# Patient Record
Sex: FEMALE | Race: WHITE | NOT HISPANIC OR LATINO | Employment: UNEMPLOYED | URBAN - METROPOLITAN AREA
[De-identification: names, ages, dates, MRNs, and addresses within clinical notes are randomized per-mention and may not be internally consistent; named-entity substitution may affect disease eponyms.]

---

## 2017-06-15 ENCOUNTER — OFFICE VISIT - HEALTHEAST (OUTPATIENT)
Dept: FAMILY MEDICINE | Facility: CLINIC | Age: 47
End: 2017-06-15

## 2017-06-15 DIAGNOSIS — Z63.9 FAMILY PROBLEMS: ICD-10-CM

## 2017-06-15 DIAGNOSIS — F51.04 PSYCHOPHYSIOLOGICAL INSOMNIA: ICD-10-CM

## 2017-06-15 DIAGNOSIS — F41.9 ANXIETY: ICD-10-CM

## 2017-06-15 SDOH — SOCIAL STABILITY - SOCIAL INSECURITY: PROBLEM RELATED TO PRIMARY SUPPORT GROUP, UNSPECIFIED: Z63.9

## 2017-06-15 ASSESSMENT — MIFFLIN-ST. JEOR: SCORE: 1646.68

## 2017-07-26 ENCOUNTER — RECORDS - HEALTHEAST (OUTPATIENT)
Dept: GENERAL RADIOLOGY | Age: 47
End: 2017-07-26

## 2017-07-26 ENCOUNTER — OFFICE VISIT - HEALTHEAST (OUTPATIENT)
Dept: FAMILY MEDICINE | Facility: CLINIC | Age: 47
End: 2017-07-26

## 2017-07-26 DIAGNOSIS — R82.2 BILIRUBIN IN URINE: ICD-10-CM

## 2017-07-26 DIAGNOSIS — R50.9 FEVER, UNSPECIFIED: ICD-10-CM

## 2017-07-26 DIAGNOSIS — R50.9 FEVER: ICD-10-CM

## 2017-08-14 ENCOUNTER — HOSPITAL ENCOUNTER (OUTPATIENT)
Dept: MAMMOGRAPHY | Facility: HOSPITAL | Age: 47
Discharge: HOME | End: 2017-08-14
Attending: INTERNAL MEDICINE

## 2017-08-14 ENCOUNTER — OFFICE VISIT - HEALTHEAST (OUTPATIENT)
Dept: ONCOLOGY | Facility: HOSPITAL | Age: 47
End: 2017-08-14

## 2017-08-14 DIAGNOSIS — C50.412 BREAST CANCER OF UPPER-OUTER QUADRANT OF LEFT FEMALE BREAST (H): ICD-10-CM

## 2017-09-07 ENCOUNTER — COMMUNICATION - HEALTHEAST (OUTPATIENT)
Dept: FAMILY MEDICINE | Facility: CLINIC | Age: 47
End: 2017-09-07

## 2017-09-07 ENCOUNTER — RECORDS - HEALTHEAST (OUTPATIENT)
Dept: ADMINISTRATIVE | Facility: OTHER | Age: 47
End: 2017-09-07

## 2017-09-07 ENCOUNTER — AMBULATORY - HEALTHEAST (OUTPATIENT)
Dept: FAMILY MEDICINE | Facility: CLINIC | Age: 47
End: 2017-09-07

## 2017-10-06 ENCOUNTER — COMMUNICATION - HEALTHEAST (OUTPATIENT)
Dept: FAMILY MEDICINE | Facility: CLINIC | Age: 47
End: 2017-10-06

## 2017-10-12 ENCOUNTER — AMBULATORY - HEALTHEAST (OUTPATIENT)
Dept: FAMILY MEDICINE | Facility: CLINIC | Age: 47
End: 2017-10-12

## 2017-10-12 ENCOUNTER — RECORDS - HEALTHEAST (OUTPATIENT)
Dept: ADMINISTRATIVE | Facility: OTHER | Age: 47
End: 2017-10-12

## 2017-12-27 ENCOUNTER — HOSPITAL ENCOUNTER (OUTPATIENT)
Dept: MAMMOGRAPHY | Facility: HOSPITAL | Age: 47
Discharge: HOME | End: 2017-12-27
Attending: INTERNAL MEDICINE

## 2017-12-27 DIAGNOSIS — Z12.31 VISIT FOR SCREENING MAMMOGRAM: ICD-10-CM

## 2018-01-09 ENCOUNTER — OFFICE VISIT - HEALTHEAST (OUTPATIENT)
Dept: FAMILY MEDICINE | Facility: CLINIC | Age: 48
End: 2018-01-09

## 2018-01-09 DIAGNOSIS — R51.9 HEADACHE: ICD-10-CM

## 2018-01-09 DIAGNOSIS — L98.9 SKIN LESION OF RIGHT LOWER EXTREMITY: ICD-10-CM

## 2018-01-09 DIAGNOSIS — M99.01 SOMATIC DYSFUNCTION OF CERVICAL REGION: ICD-10-CM

## 2018-01-09 DIAGNOSIS — M99.02 SOMATIC DYSFUNCTION OF THORACIC REGION: ICD-10-CM

## 2018-01-09 ASSESSMENT — MIFFLIN-ST. JEOR: SCORE: 1660.29

## 2018-03-29 ENCOUNTER — COMMUNICATION - HEALTHEAST (OUTPATIENT)
Dept: FAMILY MEDICINE | Facility: CLINIC | Age: 48
End: 2018-03-29

## 2018-03-29 ENCOUNTER — COMMUNICATION - HEALTHEAST (OUTPATIENT)
Dept: SCHEDULING | Facility: CLINIC | Age: 48
End: 2018-03-29

## 2018-03-29 DIAGNOSIS — J45.909 ASTHMA: ICD-10-CM

## 2018-07-17 ENCOUNTER — AMBULATORY - HEALTHEAST (OUTPATIENT)
Dept: FAMILY MEDICINE | Facility: CLINIC | Age: 48
End: 2018-07-17

## 2018-07-17 DIAGNOSIS — J45.909 REACTIVE AIRWAY DISEASE: ICD-10-CM

## 2018-07-17 DIAGNOSIS — L91.8 SKIN TAGS, MULTIPLE ACQUIRED: ICD-10-CM

## 2018-08-14 ENCOUNTER — AMBULATORY - HEALTHEAST (OUTPATIENT)
Dept: LAB | Facility: CLINIC | Age: 48
End: 2018-08-14

## 2018-08-14 DIAGNOSIS — C50.412 BREAST CANCER OF UPPER-OUTER QUADRANT OF LEFT FEMALE BREAST (H): ICD-10-CM

## 2018-08-14 LAB
ALBUMIN SERPL-MCNC: 3.8 G/DL (ref 3.5–5)
ALP SERPL-CCNC: 111 U/L (ref 45–120)
ALT SERPL W P-5'-P-CCNC: 16 U/L (ref 0–45)
ANION GAP SERPL CALCULATED.3IONS-SCNC: 14 MMOL/L (ref 5–18)
AST SERPL W P-5'-P-CCNC: 18 U/L (ref 0–40)
BASOPHILS # BLD AUTO: 0.1 THOU/UL (ref 0–0.2)
BASOPHILS NFR BLD AUTO: 1 % (ref 0–2)
BILIRUB SERPL-MCNC: 0.4 MG/DL (ref 0–1)
BUN SERPL-MCNC: 11 MG/DL (ref 8–22)
CALCIUM SERPL-MCNC: 9.8 MG/DL (ref 8.5–10.5)
CHLORIDE BLD-SCNC: 106 MMOL/L (ref 98–107)
CO2 SERPL-SCNC: 23 MMOL/L (ref 22–31)
CREAT SERPL-MCNC: 0.8 MG/DL (ref 0.6–1.1)
EOSINOPHIL # BLD AUTO: 0.3 THOU/UL (ref 0–0.4)
EOSINOPHIL NFR BLD AUTO: 3 % (ref 0–6)
ERYTHROCYTE [DISTWIDTH] IN BLOOD BY AUTOMATED COUNT: 13.9 % (ref 11–14.5)
GFR SERPL CREATININE-BSD FRML MDRD: >60 ML/MIN/1.73M2
GLUCOSE BLD-MCNC: 104 MG/DL (ref 70–125)
HCT VFR BLD AUTO: 40.1 % (ref 35–47)
HGB BLD-MCNC: 13.1 G/DL (ref 12–16)
LYMPHOCYTES # BLD AUTO: 2.6 THOU/UL (ref 0.8–4.4)
LYMPHOCYTES NFR BLD AUTO: 27 % (ref 20–40)
MCH RBC QN AUTO: 25.5 PG (ref 27–34)
MCHC RBC AUTO-ENTMCNC: 32.7 G/DL (ref 32–36)
MCV RBC AUTO: 78 FL (ref 80–100)
MONOCYTES # BLD AUTO: 0.6 THOU/UL (ref 0–0.9)
MONOCYTES NFR BLD AUTO: 6 % (ref 2–10)
NEUTROPHILS # BLD AUTO: 6.1 THOU/UL (ref 2–7.7)
NEUTROPHILS NFR BLD AUTO: 63 % (ref 50–70)
PLATELET # BLD AUTO: 324 THOU/UL (ref 140–440)
PMV BLD AUTO: 7.4 FL (ref 7–10)
POTASSIUM BLD-SCNC: 4.4 MMOL/L (ref 3.5–5)
PROT SERPL-MCNC: 7.1 G/DL (ref 6–8)
RBC # BLD AUTO: 5.13 MILL/UL (ref 3.8–5.4)
SODIUM SERPL-SCNC: 143 MMOL/L (ref 136–145)
WBC: 9.7 THOU/UL (ref 4–11)

## 2018-08-16 ENCOUNTER — OFFICE VISIT - HEALTHEAST (OUTPATIENT)
Dept: ONCOLOGY | Facility: HOSPITAL | Age: 48
End: 2018-08-16

## 2018-08-16 DIAGNOSIS — Z17.0 MALIGNANT NEOPLASM OF UPPER-OUTER QUADRANT OF LEFT BREAST IN FEMALE, ESTROGEN RECEPTOR POSITIVE (H): ICD-10-CM

## 2018-08-16 DIAGNOSIS — C50.412 MALIGNANT NEOPLASM OF UPPER-OUTER QUADRANT OF LEFT BREAST IN FEMALE, ESTROGEN RECEPTOR POSITIVE (H): ICD-10-CM

## 2018-10-29 ENCOUNTER — AMBULATORY - HEALTHEAST (OUTPATIENT)
Dept: FAMILY MEDICINE | Facility: CLINIC | Age: 48
End: 2018-10-29

## 2018-10-29 DIAGNOSIS — J20.9 ACUTE BRONCHITIS: ICD-10-CM

## 2019-02-27 ENCOUNTER — HOSPITAL ENCOUNTER (OUTPATIENT)
Dept: MAMMOGRAPHY | Facility: CLINIC | Age: 49
Discharge: HOME | End: 2019-02-27
Attending: INTERNAL MEDICINE

## 2019-02-27 DIAGNOSIS — Z12.31 VISIT FOR SCREENING MAMMOGRAM: ICD-10-CM

## 2019-03-05 ENCOUNTER — OFFICE VISIT - HEALTHEAST (OUTPATIENT)
Dept: FAMILY MEDICINE | Facility: CLINIC | Age: 49
End: 2019-03-05

## 2019-03-05 DIAGNOSIS — J06.9 VIRAL URI WITH COUGH: ICD-10-CM

## 2019-03-05 DIAGNOSIS — R07.0 THROAT PAIN: ICD-10-CM

## 2019-03-05 DIAGNOSIS — J45.21 EXACERBATION OF INTERMITTENT ASTHMA, UNSPECIFIED ASTHMA SEVERITY: ICD-10-CM

## 2019-03-05 LAB — DEPRECATED S PYO AG THROAT QL EIA: NORMAL

## 2019-03-06 LAB — GROUP A STREP BY PCR: NORMAL

## 2019-05-20 ENCOUNTER — COMMUNICATION - HEALTHEAST (OUTPATIENT)
Dept: ADMINISTRATIVE | Facility: HOSPITAL | Age: 49
End: 2019-05-20

## 2019-06-12 ENCOUNTER — OFFICE VISIT - HEALTHEAST (OUTPATIENT)
Dept: FAMILY MEDICINE | Facility: CLINIC | Age: 49
End: 2019-06-12

## 2019-06-12 DIAGNOSIS — R10.13 ABDOMINAL PAIN, EPIGASTRIC: ICD-10-CM

## 2019-06-12 DIAGNOSIS — Z90.12 H/O MASTECTOMY, LEFT: ICD-10-CM

## 2019-06-12 ASSESSMENT — MIFFLIN-ST. JEOR: SCORE: 1686.14

## 2019-08-07 ENCOUNTER — AMBULATORY - HEALTHEAST (OUTPATIENT)
Dept: LAB | Facility: CLINIC | Age: 49
End: 2019-08-07

## 2019-08-07 DIAGNOSIS — Z17.0 MALIGNANT NEOPLASM OF UPPER-OUTER QUADRANT OF LEFT BREAST IN FEMALE, ESTROGEN RECEPTOR POSITIVE (H): ICD-10-CM

## 2019-08-07 DIAGNOSIS — C50.412 MALIGNANT NEOPLASM OF UPPER-OUTER QUADRANT OF LEFT BREAST IN FEMALE, ESTROGEN RECEPTOR POSITIVE (H): ICD-10-CM

## 2019-08-07 LAB
ALBUMIN SERPL-MCNC: 4.1 G/DL (ref 3.5–5)
ALP SERPL-CCNC: 118 U/L (ref 45–120)
ALT SERPL W P-5'-P-CCNC: 14 U/L (ref 0–45)
ANION GAP SERPL CALCULATED.3IONS-SCNC: 10 MMOL/L (ref 5–18)
AST SERPL W P-5'-P-CCNC: 17 U/L (ref 0–40)
BASOPHILS # BLD AUTO: 0.2 THOU/UL (ref 0–0.2)
BASOPHILS NFR BLD AUTO: 1 % (ref 0–2)
BILIRUB SERPL-MCNC: 0.4 MG/DL (ref 0–1)
BUN SERPL-MCNC: 13 MG/DL (ref 8–22)
CALCIUM SERPL-MCNC: 10.3 MG/DL (ref 8.5–10.5)
CHLORIDE BLD-SCNC: 102 MMOL/L (ref 98–107)
CO2 SERPL-SCNC: 26 MMOL/L (ref 22–31)
CREAT SERPL-MCNC: 0.92 MG/DL (ref 0.6–1.1)
EOSINOPHIL # BLD AUTO: 0.3 THOU/UL (ref 0–0.4)
EOSINOPHIL NFR BLD AUTO: 2 % (ref 0–6)
ERYTHROCYTE [DISTWIDTH] IN BLOOD BY AUTOMATED COUNT: 13.9 % (ref 11–14.5)
GFR SERPL CREATININE-BSD FRML MDRD: >60 ML/MIN/1.73M2
GLUCOSE BLD-MCNC: 117 MG/DL (ref 70–125)
HCT VFR BLD AUTO: 39.2 % (ref 35–47)
HGB BLD-MCNC: 13.2 G/DL (ref 12–16)
LYMPHOCYTES # BLD AUTO: 2.9 THOU/UL (ref 0.8–4.4)
LYMPHOCYTES NFR BLD AUTO: 21 % (ref 20–40)
MCH RBC QN AUTO: 26.1 PG (ref 27–34)
MCHC RBC AUTO-ENTMCNC: 33.7 G/DL (ref 32–36)
MCV RBC AUTO: 78 FL (ref 80–100)
MONOCYTES # BLD AUTO: 0.6 THOU/UL (ref 0–0.9)
MONOCYTES NFR BLD AUTO: 4 % (ref 2–10)
NEUTROPHILS # BLD AUTO: 10.3 THOU/UL (ref 2–7.7)
NEUTROPHILS NFR BLD AUTO: 72 % (ref 50–70)
PLATELET # BLD AUTO: 389 THOU/UL (ref 140–440)
PMV BLD AUTO: 7.4 FL (ref 7–10)
POTASSIUM BLD-SCNC: 4.2 MMOL/L (ref 3.5–5)
PROT SERPL-MCNC: 7.9 G/DL (ref 6–8)
RBC # BLD AUTO: 5.05 MILL/UL (ref 3.8–5.4)
SODIUM SERPL-SCNC: 138 MMOL/L (ref 136–145)
WBC: 14.3 THOU/UL (ref 4–11)

## 2019-08-21 ENCOUNTER — OFFICE VISIT - HEALTHEAST (OUTPATIENT)
Dept: ONCOLOGY | Facility: HOSPITAL | Age: 49
End: 2019-08-21

## 2019-08-21 DIAGNOSIS — Z17.0 MALIGNANT NEOPLASM OF UPPER-OUTER QUADRANT OF LEFT BREAST IN FEMALE, ESTROGEN RECEPTOR POSITIVE (H): ICD-10-CM

## 2019-08-21 DIAGNOSIS — C50.412 MALIGNANT NEOPLASM OF UPPER-OUTER QUADRANT OF LEFT BREAST IN FEMALE, ESTROGEN RECEPTOR POSITIVE (H): ICD-10-CM

## 2019-10-21 ENCOUNTER — COMMUNICATION - HEALTHEAST (OUTPATIENT)
Dept: OTHER | Facility: CLINIC | Age: 49
End: 2019-10-21

## 2019-10-28 ENCOUNTER — AMBULATORY - HEALTHEAST (OUTPATIENT)
Dept: OTHER | Facility: CLINIC | Age: 49
End: 2019-10-28

## 2019-11-11 ENCOUNTER — AMBULATORY - HEALTHEAST (OUTPATIENT)
Dept: OTHER | Facility: CLINIC | Age: 49
End: 2019-11-11

## 2019-11-13 ENCOUNTER — OFFICE VISIT - HEALTHEAST (OUTPATIENT)
Dept: FAMILY MEDICINE | Facility: CLINIC | Age: 49
End: 2019-11-13

## 2019-11-13 DIAGNOSIS — B35.3 TINEA PEDIS OF RIGHT FOOT: ICD-10-CM

## 2019-11-13 DIAGNOSIS — R07.0 THROAT PAIN: ICD-10-CM

## 2019-11-13 LAB — DEPRECATED S PYO AG THROAT QL EIA: NORMAL

## 2019-11-14 LAB — GROUP A STREP BY PCR: NORMAL

## 2019-11-24 ENCOUNTER — OFFICE VISIT - HEALTHEAST (OUTPATIENT)
Dept: FAMILY MEDICINE | Facility: CLINIC | Age: 49
End: 2019-11-24

## 2019-11-24 DIAGNOSIS — R05.9 COUGH: ICD-10-CM

## 2019-11-24 DIAGNOSIS — J20.9 ACUTE BRONCHITIS WITH SYMPTOMS GREATER THAN 10 DAYS: ICD-10-CM

## 2019-12-13 ENCOUNTER — AMBULATORY - HEALTHEAST (OUTPATIENT)
Dept: OTHER | Facility: CLINIC | Age: 49
End: 2019-12-13

## 2020-02-10 ENCOUNTER — COMMUNICATION - HEALTHEAST (OUTPATIENT)
Dept: SCHEDULING | Facility: CLINIC | Age: 50
End: 2020-02-10

## 2020-05-11 ENCOUNTER — TRANSFERRED RECORDS (OUTPATIENT)
Dept: HEALTH INFORMATION MANAGEMENT | Facility: CLINIC | Age: 50
End: 2020-05-11

## 2020-05-11 ENCOUNTER — HOSPITAL ENCOUNTER (OUTPATIENT)
Dept: MAMMOGRAPHY | Facility: CLINIC | Age: 50
Discharge: HOME | End: 2020-05-11
Attending: INTERNAL MEDICINE

## 2020-05-11 DIAGNOSIS — Z12.31 VISIT FOR SCREENING MAMMOGRAM: ICD-10-CM

## 2020-05-13 ENCOUNTER — HOSPITAL ENCOUNTER (OUTPATIENT)
Dept: MAMMOGRAPHY | Facility: CLINIC | Age: 50
Discharge: HOME | End: 2020-05-13
Attending: INTERNAL MEDICINE

## 2020-05-13 DIAGNOSIS — N64.89 BREAST ASYMMETRY: ICD-10-CM

## 2020-05-29 ENCOUNTER — OFFICE VISIT - HEALTHEAST (OUTPATIENT)
Dept: FAMILY MEDICINE | Facility: CLINIC | Age: 50
End: 2020-05-29

## 2020-05-29 DIAGNOSIS — S91.312A FOOT LACERATION, LEFT, INITIAL ENCOUNTER: ICD-10-CM

## 2020-08-13 ENCOUNTER — OFFICE VISIT - HEALTHEAST (OUTPATIENT)
Dept: ONCOLOGY | Facility: HOSPITAL | Age: 50
End: 2020-08-13

## 2020-08-13 DIAGNOSIS — C50.412 MALIGNANT NEOPLASM OF UPPER-OUTER QUADRANT OF LEFT BREAST IN FEMALE, ESTROGEN RECEPTOR POSITIVE (H): ICD-10-CM

## 2020-08-13 DIAGNOSIS — Z17.0 MALIGNANT NEOPLASM OF UPPER-OUTER QUADRANT OF LEFT BREAST IN FEMALE, ESTROGEN RECEPTOR POSITIVE (H): ICD-10-CM

## 2020-08-13 ASSESSMENT — MIFFLIN-ST. JEOR: SCORE: 1705.64

## 2020-12-14 ENCOUNTER — OFFICE VISIT (OUTPATIENT)
Dept: FAMILY MEDICINE | Facility: CLINIC | Age: 50
End: 2020-12-14
Payer: COMMERCIAL

## 2020-12-14 VITALS
RESPIRATION RATE: 12 BRPM | SYSTOLIC BLOOD PRESSURE: 136 MMHG | HEART RATE: 64 BPM | BODY MASS INDEX: 39.99 KG/M2 | HEIGHT: 65 IN | TEMPERATURE: 97.8 F | WEIGHT: 240 LBS | DIASTOLIC BLOOD PRESSURE: 84 MMHG

## 2020-12-14 DIAGNOSIS — Z87.898 HX OF FEVER: ICD-10-CM

## 2020-12-14 DIAGNOSIS — R42 VERTIGO: Primary | ICD-10-CM

## 2020-12-14 DIAGNOSIS — E66.01 MORBID OBESITY (H): ICD-10-CM

## 2020-12-14 DIAGNOSIS — Z00.00 HEALTHCARE MAINTENANCE: ICD-10-CM

## 2020-12-14 PROBLEM — L71.9 ROSACEA: Status: ACTIVE | Noted: 2020-12-14

## 2020-12-14 PROBLEM — M94.0 TIETZE'S DISEASE: Status: ACTIVE | Noted: 2020-12-14

## 2020-12-14 PROBLEM — R06.2 WHEEZING: Status: ACTIVE | Noted: 2020-12-14

## 2020-12-14 PROBLEM — I83.90 ASYMPTOMATIC VARICOSE VEINS: Status: ACTIVE | Noted: 2020-12-14

## 2020-12-14 PROBLEM — R10.9 ABDOMINAL PAIN, OTHER SPECIFIED SITE: Status: ACTIVE | Noted: 2020-12-14

## 2020-12-14 PROBLEM — E78.5 HYPERLIPIDEMIA: Status: ACTIVE | Noted: 2020-12-14

## 2020-12-14 PROBLEM — T50.901A POISONING BY DRUG: Status: ACTIVE | Noted: 2020-12-14

## 2020-12-14 LAB
ALBUMIN SERPL-MCNC: 3.9 G/DL (ref 3.4–5)
ALP SERPL-CCNC: 131 U/L (ref 40–150)
ALT SERPL W P-5'-P-CCNC: 21 U/L (ref 0–50)
ANION GAP SERPL CALCULATED.3IONS-SCNC: 3 MMOL/L (ref 3–14)
AST SERPL W P-5'-P-CCNC: 18 U/L (ref 0–45)
BILIRUB SERPL-MCNC: 0.5 MG/DL (ref 0.2–1.3)
BUN SERPL-MCNC: 10 MG/DL (ref 7–30)
CALCIUM SERPL-MCNC: 9.5 MG/DL (ref 8.5–10.1)
CHLORIDE SERPL-SCNC: 105 MMOL/L (ref 94–109)
CHOLEST SERPL-MCNC: 211 MG/DL
CO2 SERPL-SCNC: 29 MMOL/L (ref 20–32)
CREAT SERPL-MCNC: 0.79 MG/DL (ref 0.52–1.04)
ERYTHROCYTE [DISTWIDTH] IN BLOOD BY AUTOMATED COUNT: 15.4 % (ref 10–15)
GFR SERPL CREATININE-BSD FRML MDRD: 87 ML/MIN/{1.73_M2}
GLUCOSE SERPL-MCNC: 113 MG/DL (ref 70–99)
HBA1C MFR BLD: 6.1 % (ref 0–5.6)
HCT VFR BLD AUTO: 41.9 % (ref 35–47)
HDLC SERPL-MCNC: 34 MG/DL
HGB BLD-MCNC: 13.5 G/DL (ref 11.7–15.7)
IRON SATN MFR SERPL: 14 % (ref 15–46)
IRON SERPL-MCNC: 47 UG/DL (ref 35–180)
LDLC SERPL CALC-MCNC: 131 MG/DL
MCH RBC QN AUTO: 25.7 PG (ref 26.5–33)
MCHC RBC AUTO-ENTMCNC: 32.2 G/DL (ref 31.5–36.5)
MCV RBC AUTO: 80 FL (ref 78–100)
NONHDLC SERPL-MCNC: 177 MG/DL
PLATELET # BLD AUTO: 332 10E9/L (ref 150–450)
POTASSIUM SERPL-SCNC: 4.6 MMOL/L (ref 3.4–5.3)
PROT SERPL-MCNC: 8.2 G/DL (ref 6.8–8.8)
RBC # BLD AUTO: 5.26 10E12/L (ref 3.8–5.2)
SODIUM SERPL-SCNC: 137 MMOL/L (ref 133–144)
TIBC SERPL-MCNC: 326 UG/DL (ref 240–430)
TRIGL SERPL-MCNC: 232 MG/DL
WBC # BLD AUTO: 10.8 10E9/L (ref 4–11)

## 2020-12-14 PROCEDURE — 85027 COMPLETE CBC AUTOMATED: CPT | Performed by: FAMILY MEDICINE

## 2020-12-14 PROCEDURE — 80061 LIPID PANEL: CPT | Performed by: FAMILY MEDICINE

## 2020-12-14 PROCEDURE — 36415 COLL VENOUS BLD VENIPUNCTURE: CPT | Performed by: FAMILY MEDICINE

## 2020-12-14 PROCEDURE — 82306 VITAMIN D 25 HYDROXY: CPT | Performed by: FAMILY MEDICINE

## 2020-12-14 PROCEDURE — 80053 COMPREHEN METABOLIC PANEL: CPT | Performed by: FAMILY MEDICINE

## 2020-12-14 PROCEDURE — 99203 OFFICE O/P NEW LOW 30 MIN: CPT | Performed by: FAMILY MEDICINE

## 2020-12-14 PROCEDURE — 83540 ASSAY OF IRON: CPT | Performed by: FAMILY MEDICINE

## 2020-12-14 PROCEDURE — 83036 HEMOGLOBIN GLYCOSYLATED A1C: CPT | Performed by: FAMILY MEDICINE

## 2020-12-14 PROCEDURE — 83550 IRON BINDING TEST: CPT | Performed by: FAMILY MEDICINE

## 2020-12-14 RX ORDER — ALBUTEROL SULFATE 90 UG/1
2 AEROSOL, METERED RESPIRATORY (INHALATION)
COMMUNITY
Start: 2019-03-05 | End: 2022-02-25

## 2020-12-14 ASSESSMENT — MIFFLIN-ST. JEOR: SCORE: 1696.57

## 2020-12-15 NOTE — PROGRESS NOTES
"Assessment/Plan:    Nichol Gee is a 50 year old female presenting for:    1. Vertigo  Labs below will be drawn.  I suspect this is most likely due to benign paroxysmal positional vertigo.  She will contact me if this continues to worsen at which point we should consider formal physical therapy.    - Vitamin D Deficiency  - CBC with platelets  - Iron and iron binding capacity  - Thyroid Cascade Profile ([facility])  - Comprehensive metabolic panel (BMP + Alb, Alk Phos, ALT, AST, Total. Bili, TP)  - Hemoglobin A1c    2. Healthcare maintenance  - Lipid panel reflex to direct LDL Fasting    3. Hx of fever  Due to history of fever will check Covid antibody.  Discussed that because her fever was in January likely is will be negative even if she had coronavirus.  - COVID-19 Virus (Coronavirus) Antibody & Titer Reflex; Future    There are no discontinued medications.        Chief Complaint:  Dizziness and Recheck Medication        Subjective:   Nichol Gee is a very pleasant 50-year-old female presenting to the clinic today for several concerns:    1.  Vertigo: Patient states that occasionally over the last 2 weeks she feels a bit \"woozy.\"  She does not feel specifically vertiginous but slightly off balance.  She notes this when she turns her head or makes any movements.  She has not had this in the past.  She is very concerned that this could potentially be indicative of diabetes mellitus or a different deficiency.    2.  Healthcare maintenance: Patient is hoping to get a cholesterol level drawn today.  She is fasting.    3.  History of fever: She states that she was in January.  She is wondering about Covid antibody testing.    12 point review of systems completed and negative except for what has been described above    History   Smoking Status     Never Smoker   Smokeless Tobacco     Never Used         Current Outpatient Medications:      albuterol (PROAIR HFA/PROVENTIL HFA/VENTOLIN HFA) 108 (90 Base) MCG/ACT " "inhaler, Inhale 2 puffs into the lungs, Disp: , Rfl:      ranitidine (ZANTAC) 150 MG tablet, Take 150 mg by mouth, Disp: , Rfl:       Objective:  Vitals:    12/14/20 0855 12/14/20 0919   BP: (!) 142/80 136/84   Pulse: 64    Resp: 12    Temp: 97.8  F (36.6  C)    TempSrc: Tympanic    Weight: 108.9 kg (240 lb)    Height: 1.638 m (5' 4.5\")        Body mass index is 40.56 kg/m .    Vital signs reviewed and stable  General: No acute distress  Psych: Appropriate affect  HEENT: moist mucous membranes, pupils equal, round, reactive to light and accomodation, tympanic membranes are pearly grey bilaterally  Lymph: no cervical or supraclavicular lymphadenopathy  Cardiovascular: regular rate and rhythm with no murmur  Pulmonary: clear to auscultation bilaterally with no wheeze  Abdomen: soft, non tender, non distended with normo-active bowel sounds  Extremities: warm and well perfused with no edema  Skin: warm and dry with no rash  Neurologic: Cranial nerve II through XII are intact, 5 out of 5 strength in upper and lower extremities, unable to reproduce lightheadedness/dizziness     This note has been dictated and transcribed using voice recognition software.   Any errors in transcription are unintentional and inherent to the software.  "

## 2020-12-16 LAB — DEPRECATED CALCIDIOL+CALCIFEROL SERPL-MC: 9 UG/L (ref 20–75)

## 2020-12-17 DIAGNOSIS — E55.9 HYPOVITAMINOSIS D: Primary | ICD-10-CM

## 2020-12-17 RX ORDER — ERGOCALCIFEROL 1.25 MG/1
50000 CAPSULE, LIQUID FILLED ORAL WEEKLY
Qty: 12 CAPSULE | Refills: 0 | Status: SHIPPED | OUTPATIENT
Start: 2020-12-17 | End: 2021-01-21

## 2021-01-09 ENCOUNTER — HEALTH MAINTENANCE LETTER (OUTPATIENT)
Age: 51
End: 2021-01-09

## 2021-01-21 ENCOUNTER — MYC MEDICAL ADVICE (OUTPATIENT)
Dept: FAMILY MEDICINE | Facility: CLINIC | Age: 51
End: 2021-01-21

## 2021-01-21 DIAGNOSIS — E55.9 HYPOVITAMINOSIS D: ICD-10-CM

## 2021-01-21 RX ORDER — ERGOCALCIFEROL 1.25 MG/1
50000 CAPSULE, LIQUID FILLED ORAL WEEKLY
Qty: 12 CAPSULE | Refills: 0 | Status: CANCELLED | OUTPATIENT
Start: 2021-01-21

## 2021-01-21 RX ORDER — ERGOCALCIFEROL 1.25 MG/1
50000 CAPSULE, LIQUID FILLED ORAL WEEKLY
Qty: 4 CAPSULE | Refills: 0 | Status: SHIPPED | OUTPATIENT
Start: 2021-01-21 | End: 2021-01-21

## 2021-01-21 NOTE — TELEPHONE ENCOUNTER
See my chart, patient says that only 8 pills called in for the Vit D, the remainder 4 sent today, then this nurse again discontinued due to looked like there was 12 sent on 12-, will again remind patient needs labs at 12 week hortencia.    KIMBERLY Ronquillo

## 2021-02-11 ENCOUNTER — TELEPHONE (OUTPATIENT)
Dept: FAMILY MEDICINE | Facility: CLINIC | Age: 51
End: 2021-02-11

## 2021-02-11 DIAGNOSIS — E55.9 HYPOVITAMINOSIS D: Primary | ICD-10-CM

## 2021-02-11 NOTE — TELEPHONE ENCOUNTER
"Patient calls and is trying to schedule a vitamin D recheck. No labs in place. Per dictation from 12/17/20:  Viewed by Nichol Gee on 1/21/2021 12:15 PM  Written by Mitzi Jane MD on 12/17/2020  8:38 AM  Vitamin D is very low - I would recommend a prescription of 50,000 IU of vitamin D once weekly for 12 weeks - I will send this to your pharmacy now.  We can recheck your blood levels at a \"lab only\" appointment in about 3 months.  Please contact the clinic to schedule that appointment after you take your last of the 12 tablets.    Placed Vitamin D lab per written order above. Patient will schedule.   "

## 2021-03-08 DIAGNOSIS — E55.9 HYPOVITAMINOSIS D: ICD-10-CM

## 2021-03-08 LAB — DEPRECATED CALCIDIOL+CALCIFEROL SERPL-MC: 21 UG/L (ref 20–75)

## 2021-03-08 PROCEDURE — 82306 VITAMIN D 25 HYDROXY: CPT | Performed by: FAMILY MEDICINE

## 2021-03-08 PROCEDURE — 36415 COLL VENOUS BLD VENIPUNCTURE: CPT | Performed by: FAMILY MEDICINE

## 2021-05-25 ENCOUNTER — HOSPITAL ENCOUNTER (OUTPATIENT)
Dept: MAMMOGRAPHY | Facility: CLINIC | Age: 51
Discharge: HOME | End: 2021-05-25
Attending: INTERNAL MEDICINE
Payer: COMMERCIAL

## 2021-05-25 ENCOUNTER — RECORDS - HEALTHEAST (OUTPATIENT)
Dept: ADMINISTRATIVE | Facility: CLINIC | Age: 51
End: 2021-05-25

## 2021-05-25 DIAGNOSIS — Z12.31 VISIT FOR SCREENING MAMMOGRAM: ICD-10-CM

## 2021-05-26 ENCOUNTER — RECORDS - HEALTHEAST (OUTPATIENT)
Dept: ADMINISTRATIVE | Facility: CLINIC | Age: 51
End: 2021-05-26

## 2021-05-27 ENCOUNTER — RECORDS - HEALTHEAST (OUTPATIENT)
Dept: ADMINISTRATIVE | Facility: CLINIC | Age: 51
End: 2021-05-27

## 2021-05-29 NOTE — PROGRESS NOTES
Assessment/Plan:    Nichol Gee is a 48 y.o. female presenting for:    1. Abdominal pain, epigastric  Given the nature of the patient's pain as well as her benign physical examination today as well as the temporal relation to food this seems most likely to be gastritis.  I think this is much less likely a heart condition or her gallbladder although these were considered today.    Zantac sent to the pharmacy.  She will take this twice daily for the next 2 weeks.  Carafate suspension sent as well and she will use this prior to eating if insurance covers it.    She will contact me if there are further concerns of her pain is worsening.  Certainly we could try Prilosec at some point if needed as well but I suspect that over the next few days things will improve.  Discussed a bland diet.    We did discuss signs and symptoms that would necessitate further follow-up.  - ranitidine (ZANTAC) 150 MG tablet; Take 1 tablet (150 mg total) by mouth 2 (two) times a day.  Dispense: 60 tablet; Refill: 1  - sucralfate (CARAFATE) 100 mg/mL suspension; Take 10 mL (1 g total) by mouth 4 (four) times a day.  Dispense: 420 mL; Refill: 1    2. H/O mastectomy, left  Prescription for mastectomy bras given today.  If she is 12 years in remission.  She follows with oncology yearly.        Medications Discontinued During This Encounter   Medication Reason     cholecalciferol, vitamin D3, (VITAMIN D3) 5,000 unit Tab      diazePAM (VALIUM) 5 MG tablet Therapy completed     propranolol (INDERAL) 20 MG tablet Therapy completed           Chief Complaint:  Chief Complaint   Patient presents with     Abdominal Pain     Pt complains of abdominal pain located in the center of abdomen above her belly button since Tuesday morning       Subjective:   Nichol Gee is a pleasant 48-year-old female presenting to the clinic today with concerns over abdominal pain.  Patient states that she had some very spicy dip 3 days ago.  When she awoke the day after  "eating that she had epigastric abdominal pain.  When she ate something this became much worse.  She states that she drank a soda very quickly and burped a few times which seemed to help however the pain persisted.  Is slowly improved but then she had a bagel which irritated things again.  She states that again it improved but had some soup last night which made the pain worse once again.  If she has then not eaten anything since then.  She had some water this morning which she tolerated well.  She does not have any pain currently.    She describes the pain as centrally located.  She does not have any associated shortness of breath.  She notes that it is not more on the right side or the left.  She does not have a history of any abdominal surgeries other than a hysterectomy.    12 point review of systems completed and negative except for what has been described above    Social History     Tobacco Use   Smoking Status Never Smoker   Smokeless Tobacco Never Used       Current Outpatient Medications   Medication Sig     albuterol (PROAIR HFA;PROVENTIL HFA;VENTOLIN HFA) 90 mcg/actuation inhaler Inhale 2 puffs every 6 (six) hours as needed for wheezing.     fluticasone (FLOVENT HFA) 110 mcg/actuation inhaler Inhale 1 puff 2 (two) times a day.     ibuprofen (ADVIL,MOTRIN) 200 MG tablet Take 200 mg by mouth every 6 (six) hours as needed for pain. Pt states she uses PRN     triamcinolone (KENALOG) 0.1 % cream Apply to affected area twice daily as needed.     albuterol (PROVENTIL) 2.5 mg /3 mL (0.083 %) nebulizer solution Take 3 mL (2.5 mg total) by nebulization every 4 (four) hours as needed for wheezing.     ranitidine (ZANTAC) 150 MG tablet Take 1 tablet (150 mg total) by mouth 2 (two) times a day.     sucralfate (CARAFATE) 100 mg/mL suspension Take 10 mL (1 g total) by mouth 4 (four) times a day.         Objective:  Vitals:    06/12/19 1029   BP: 108/76   Pulse: 76   Weight: (!) 237 lb 11.2 oz (107.8 kg)   Height: 5' 4.5\" " (1.638 m)       Body mass index is 40.17 kg/m .    Vital signs reviewed and stable  General: No acute distress  Psych: Appropriate affect  HEENT: moist mucous membranes, pupils equal, round, reactive to light and accomodation, posterior oropharynx clear of erythema or exudate, tympanic membranes are pearly grey bilaterally  Lymph: no cervical or supraclavicular lymphadenopathy  Cardiovascular: regular rate and rhythm with no murmur  Pulmonary: clear to auscultation bilaterally with no wheeze  Abdomen: soft, non tender, non distended with normo-active bowel sounds  Extremities: warm and well perfused with no edema  Skin: warm and dry with no rash         This note has been dictated and transcribed using voice recognition software.   Any errors in transcription are unintentional and inherent to the software.

## 2021-05-30 ENCOUNTER — RECORDS - HEALTHEAST (OUTPATIENT)
Dept: ADMINISTRATIVE | Facility: CLINIC | Age: 51
End: 2021-05-30

## 2021-05-31 ENCOUNTER — RECORDS - HEALTHEAST (OUTPATIENT)
Dept: ADMINISTRATIVE | Facility: CLINIC | Age: 51
End: 2021-05-31

## 2021-05-31 VITALS — HEIGHT: 65 IN | BODY MASS INDEX: 38.65 KG/M2 | WEIGHT: 232 LBS

## 2021-05-31 VITALS — HEIGHT: 65 IN | WEIGHT: 227.25 LBS | BODY MASS INDEX: 37.86 KG/M2

## 2021-05-31 VITALS — WEIGHT: 225.9 LBS | BODY MASS INDEX: 37.59 KG/M2

## 2021-05-31 VITALS — WEIGHT: 231 LBS | BODY MASS INDEX: 38.44 KG/M2

## 2021-05-31 NOTE — PROGRESS NOTES
[facility] Progress Note    Patient: Nichol Gee  MRN: [id number]  Date of Service: 8/21/2019        Reason for visit      1. Malignant neoplasm of upper-outer quadrant of left breast in female, estrogen receptor positive (H)        Assessment     1.         A 48 y.o.  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   Subsequent to that, she has been on aromatase inhibitor therapy from 02/2008 to July 2015.  Currently on observation.  2.         Poor CYP2D6 metabolism.  3.         Improved bone density.  4.         Some postmenopausal symptoms.      Plan     1.         RTC a year for check up.  She will have yearly mammogram.  2.         Good diet and exercise .  Talked about interventions to reduce weight if possible.  3.         Followup with me in a years' time .  4.         Mammogram on the right side in November.  5.         Advised to followup with me sooner if there is any other problem.      Clinical stage      Infiltrating Lobular Carcinoma Of The Breast Mixed With Other Types Of Carcinoma    Primary site: Breast (Left)    Clinical free text: ER+,NV+,[id number] positive by FISH    Clinical: Stage IIIA (T3, N1, cM0) signed by Jersey Ramos MD on 7/13/2015 11:05 AM    Pathologic: Stage IIIA (T3, N1a, cM0) signed by Jersey Ramos MD on 7/13/2015 11:05 AM    Summary: Stage IIIA (T3, N1a, cM0)      History     Nichol Gee is a very pleasant 48 y.o. 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  Subsequent to that, she was treated with Taxotere, carboplatin and  Herceptin for 6 cycles and subsequent to that, she was put on hormone therapy with tamoxifen but was found to have very poor CYP2D6 metabolism so she was started on aromatase inhibitor after being on Lupron therapy.  Subsequent to that, she underwent a total abdominal hysterectomy in 2009 and was on aromatase inhibitors with anastrozole from 02/2008 to July 2015.  She stopped after that.    She is also known to have osteoporosis and she is unable to tolerate oral bisphosphonates.  She got once a year Zometa. Bone density in 2016 was normal. So Zometa was stopped in 2015.    Comes in today for scheduled follow-up.  She denies any new medical problems.  Continues to do well overall.  Her mother however is not doing well.  Her dementia is getting worse.    Past Medical History     Past Medical History:   Diagnosis Date     Breast cancer (H)     11 years     Breast cyst      Hx antineoplastic chemotherapy      Hx of radiation therapy        Review of Systems   Constitutional  Constitutional (WDL): Exceptions to WDL  Fatigue: Fatigue relieved by rest  Neurosensory  Neurosensory (WDL): All neurosensory elements are within defined limits  Cardiovascular  Cardiovascular (WDL): All cardiovascular elements are within defined limits  Pulmonary  Respiratory (WDL): Exceptions to WDL  Cough: Mild symptoms, nonprescription intervention indicated  Dyspnea: Shortness of breath with moderate exertion  Gastrointestinal  Gastrointestinal (WDL): All gastrointestinal elements are within defined limits  Genitourinary  Genitourinary (WDL): All genitourinary elements are within defined limits  Integumentary  Integumentary (WDL): All integumentary elements are within defined limits  Patient Coping  Patient Coping: Accepting  Accompanied by  Accompanied by: Alone    ECOG performance status and Distress Assessment      ECOG Performance:    ECOG Performance Status: 0    Distress Assessment  Distress Assessment Score: No distress:     Pain  Status  Currently in Pain: No/denies      Vital Signs     Vitals:    08/21/19 1338   BP: 138/73   Pulse: 84   Temp: 98.2  F (36.8  C)   SpO2: 95%       Physical Exam     GENERAL: No acute distress. Cooperative in conversation.   HEENT: Pupils are equal, round and reactive. Oral mucosa is clean and intact. No ulcerations or mucositis noted. No bleeding noted.  RESP:Chest symmetric lungs are clear bilaterally per auscultation. Regular respiratory rate. No wheezes or rhonchi.  CV: Normal S1 S2 Regular, rate and rhythm. No murmurs.  ABD: Nondistended, soft, nontender. Positive bowel sounds. No organomegaly.   EXTREMITIES: No lower extremity edema.   NEURO: Non- focal. Alert and oriented x3.  Cranial nerves appear intact.  PSYCH: Within normal limits. No depression or anxiety.  SKIN: Warm dry intact.  Radiation skin changes on her left chest wall.  BREAST.  Status post mastectomy with radiation changes on the left side.  Right breast is normal.  She does have slight lumpy breast due to her young age.  She still has a Port-A-Cath in her right upper subclavian vein.  LYMPH NODES: Bilateral cervical, supraclavicular, axillary lymph node examination was done.  Negative for any palpable adenopathy.      Lab Results     Results for orders placed or performed in visit on 08/07/19   Comprehensive Metabolic Panel   Result Value Ref Range    Sodium 138 136 - 145 mmol/L    Potassium 4.2 3.5 - 5.0 mmol/L    Chloride 102 98 - 107 mmol/L    CO2 26 22 - 31 mmol/L    Anion Gap, Calculation 10 5 - 18 mmol/L    Glucose 117 70 - 125 mg/dL    BUN 13 8 - 22 mg/dL    Creatinine 0.92 0.60 - 1.10 mg/dL    GFR MDRD Af Amer >60 >60 mL/min/1.73m2    GFR MDRD Non Af Amer >60 >60 mL/min/1.73m2    Bilirubin, Total 0.4 0.0 - 1.0 mg/dL    Calcium 10.3 8.5 - 10.5 mg/dL    Protein, Total 7.9 6.0 - 8.0 g/dL    Albumin 4.1 3.5 - 5.0 g/dL    Alkaline Phosphatase 118 45 - 120 U/L    AST 17 0 - 40 U/L    ALT 14 0 - 45 U/L   HM1 (CBC with Diff)   Result Value  Ref Range    WBC 14.3 (H) 4.0 - 11.0 thou/uL    RBC 5.05 3.80 - 5.40 mill/uL    Hemoglobin 13.2 12.0 - 16.0 g/dL    Hematocrit 39.2 35.0 - 47.0 %    MCV 78 (L) 80 - 100 fL    MCH 26.1 (L) 27.0 - 34.0 pg    MCHC 33.7 32.0 - 36.0 g/dL    RDW 13.9 11.0 - 14.5 %    Platelets 389 140 - 440 thou/uL    MPV 7.4 7.0 - 10.0 fL    Neutrophils % 72 (H) 50 - 70 %    Lymphocytes % 21 20 - 40 %    Monocytes % 4 2 - 10 %    Eosinophils % 2 0 - 6 %    Basophils % 1 0 - 2 %    Neutrophils Absolute 10.3 (H) 2.0 - 7.7 thou/uL    Lymphocytes Absolute 2.9 0.8 - 4.4 thou/uL    Monocytes Absolute 0.6 0.0 - 0.9 thou/uL    Eosinophils Absolute 0.3 0.0 - 0.4 thou/uL    Basophils Absolute 0.2 0.0 - 0.2 thou/uL         Imaging Results     No results found.  Study Result     RIGHT FULL FIELD DIGITAL SCREENING MAMMOGRAM     Performed on: 2/27/19           Compared to: 12/27/2017 Mammo Screening Right, 08/14/2017 US Breast Limited (Focal) Right, 12/23/2016 Mammo Screening Right, 12/19/2015 Mammo Screening Right, 12/13/2014 Mammo Screening Right, 11/27/2013 Mammo Diagnostic Right, 11/23/2013 Mammo Screening Right, and 11/17/2012 Mammo Screening Right     Findings: The patient is status post left mastectomy. The right breast has scattered areas of fibroglandular densities. There is no radiographic evidence of malignancy.  This study was evaluated with the assistance of Computer-Aided Detection. Continue routine screening mammogram as recommended.      ACR BI-RADS Category 1: Negative         Jersey Ramos MD

## 2021-06-01 ENCOUNTER — RECORDS - HEALTHEAST (OUTPATIENT)
Dept: ADMINISTRATIVE | Facility: CLINIC | Age: 51
End: 2021-06-01

## 2021-06-01 VITALS — WEIGHT: 242.5 LBS | BODY MASS INDEX: 40.98 KG/M2

## 2021-06-02 ENCOUNTER — RECORDS - HEALTHEAST (OUTPATIENT)
Dept: ADMINISTRATIVE | Facility: CLINIC | Age: 51
End: 2021-06-02

## 2021-06-02 VITALS — BODY MASS INDEX: 40.91 KG/M2 | WEIGHT: 242.1 LBS

## 2021-06-02 NOTE — PROGRESS NOTES
S: Patient was seen in [place] to be measured for mastectomy bras and a new left breast form. I will be getting an Rx from Dr. Mitzi Jane for a breast form, but otherwise have a current one from her for the post-mastectomy bras.    O: Goal is to evaluate and measure patient for a mastectomy garment that will provide her comfort and support. In addition, we will be getting her a breast form to provide shape and mimic her remaining natural breast.    A: Nichol has been wearing the same styles of bras and breast form for the past few years. She is currently wearing a Aisha #809 bra in a size 42B with an ABC [id number] Massage form in a size 8. She says that her bra has always felt too tight in the band and she feels like her breast form is too full on the top compared to her right, natural breast. I pulled the Massage Silhouette form #[id number] size 8 from the  stock supply. She tried this on and instantly loved it. She said it feels and looks so much better compared to her natural breast. I agreed with her because it did look more flat to the top of the breast and appeared more symmetric. She liked the  T Shirt bra and her Aisha #809. I will be ordering these in 44A's since that is the size she measures into which matches up with her breast form size 8. I will also order them in 44B.  Orders submitted for fabrication to [employer] and Aisha. I will order in a new breast form as well since we are waiting on an RX to be signed off by her provider.    P: I have the patient scheduled for a fitting appointment at the [facility] in two weeks on Monday, 11/11 at 10:00 am.

## 2021-06-02 NOTE — TELEPHONE ENCOUNTER
Nichol called on 10/21 to set up an appnt for post-mastectomy supplies. She had a left mastectomy 11-12 years ago and is looking to get a new prosthesis and new post-mastectomy bras. She says that her [facility] provider wrote an order for new supplies in June. We scheduled an eval for next Monday, 10/28 at 11:00 am in [place].

## 2021-06-03 VITALS — WEIGHT: 238.1 LBS | BODY MASS INDEX: 40.24 KG/M2

## 2021-06-03 VITALS — HEIGHT: 65 IN | WEIGHT: 237.7 LBS | BODY MASS INDEX: 39.6 KG/M2

## 2021-06-03 VITALS
HEART RATE: 84 BPM | DIASTOLIC BLOOD PRESSURE: 68 MMHG | SYSTOLIC BLOOD PRESSURE: 120 MMHG | RESPIRATION RATE: 16 BRPM | TEMPERATURE: 98.4 F

## 2021-06-03 NOTE — PROGRESS NOTES
S: I have received Nichol's post-mastectomy bras and breast form. Rx on file is current.    A: I assisted Nichol in trying on the mastectomy bras and breast form. She found two bras that she really liked. They fit well and she stated they were comfortable. She went home with the Aisha #810 Soft and Smooth bra 44A in ivory, the Aisha #817 Soft and Smooth bra 44A in pink, as well as the ABC breast form style [id number] Massage Silhouette in a size 8 in blush. She was instructed on the uses of the garments and how to care for the items.    P: She is to call with any further needs. In one month from today (on December 11th) she would like to  two more bras: the Aisha #809 in white and another Aisha Soft and Smooth bra after determining what other colors there are. I will find out the other options and call her tomorrow to determine which one she would like me to order in and have ready for her to  in [place] on 12/11/19.  Goal is to maintain a home program.

## 2021-06-03 NOTE — PROGRESS NOTES
Chief Complaint   Patient presents with     Ear Pain     sore throat         HPI:   Nichol Gee is a 49 y.o. female c/o left ear pain since last night.  Sore throat this morning.  No fever.   Slight cough.  No one else at home is sick.  No chest pain.  Used some cortisporin ear drops.   Also using some advil.    Also c/o ongoing athlete's foot of right foot.  Has been using OTC antifungal cream with little success    ROS:  A 10 point comprehensive review of systems was negative except as noted.     Medications:  Current Outpatient Medications on File Prior to Visit   Medication Sig Dispense Refill     albuterol (PROAIR HFA;PROVENTIL HFA;VENTOLIN HFA) 90 mcg/actuation inhaler Inhale 2 puffs every 6 (six) hours as needed for wheezing. 18 g 0     fluticasone (FLOVENT HFA) 110 mcg/actuation inhaler Inhale 1 puff 2 (two) times a day. (Patient taking differently: Inhale 1 puff as needed.       ) 1 Inhaler 12     ibuprofen (ADVIL,MOTRIN) 200 MG tablet Take 200 mg by mouth every 6 (six) hours as needed for pain. Pt states she uses PRN       ranitidine (ZANTAC) 150 MG tablet Take 1 tablet (150 mg total) by mouth 2 (two) times a day. (Patient taking differently: Take 150 mg by mouth as needed.       ) 60 tablet 1     triamcinolone (KENALOG) 0.1 % cream Apply to affected area twice daily as needed. 30 g 0     albuterol (PROVENTIL) 2.5 mg /3 mL (0.083 %) nebulizer solution Take 3 mL (2.5 mg total) by nebulization every 4 (four) hours as needed for wheezing. 25 vial 2     No current facility-administered medications on file prior to visit.          Social History:  Social History     Tobacco Use     Smoking status: Never Smoker     Smokeless tobacco: Never Used   Substance Use Topics     Alcohol use: Yes     Comment: social         Physical Exam:   Vitals:    11/13/19 1348   BP: 120/68   Pulse: 84   Resp: 16   Temp: 98.4  F (36.9  C)   TempSrc: Oral       GENERAL:   Alert. Oriented.  EYES: Clear  HENT:  Ears: R TM pearly  gray. Normal landmarks. L TM pearly gray.  Normal landmarks  No tenderness with tugging on pinna or tragus. No anterior auricular nodes  Nose: Clear.  Sinuses: Nontender.  Oropharynx:  No erythema. No exudate.  TMJ:  nontender  NECK: Supple. No adenopathy.  LUNGS: Clear to ascultation.  No crackles.  No wheezing  HEART: RRR  SKIN:  Maceration between 3/4 toes on right    LABS:  Results for orders placed or performed in visit on 11/13/19   Rapid Strep A Screen-Throat   Result Value Ref Range    Rapid Strep A Antigen No Group A Strep detected, presumptive negative No Group A Strep detected, presumptive negative        Assessment/Plan:    1. Throat pain  Rapid Strep A Screen-Throat    Group A Strep, RNA Direct Detection, Throat   2. Tinea pedis of right foot  oxiconazole (OXISTAT) 1 % Crea         Viral infection. Strep negative  Ear exam is normal but may have some eustachian tube dysfunction.  Recommended afrin nasal spray for no longer than 4 days.  advil 2-3 tabs up to four times daily as needed for pain  REcheck if not improving    Oxiconazole for tinea.  Recheck if not improving.  Discussed that tinea pedis tends to be recurrent so will likely need to use the cream intermittently.          Scott Nguyen MD      11/13/2019    The following portions of the patient's history were reviewed and updated as appropriate: allergies, current medications, past family history, past medical history, past social history, past surgical history and problem list.

## 2021-06-04 VITALS
WEIGHT: 237.56 LBS | RESPIRATION RATE: 16 BRPM | HEART RATE: 74 BPM | TEMPERATURE: 98.3 F | DIASTOLIC BLOOD PRESSURE: 84 MMHG | BODY MASS INDEX: 40.15 KG/M2 | OXYGEN SATURATION: 97 % | SYSTOLIC BLOOD PRESSURE: 137 MMHG

## 2021-06-04 VITALS
BODY MASS INDEX: 40.32 KG/M2 | DIASTOLIC BLOOD PRESSURE: 99 MMHG | HEIGHT: 65 IN | SYSTOLIC BLOOD PRESSURE: 139 MMHG | WEIGHT: 242 LBS | OXYGEN SATURATION: 96 % | TEMPERATURE: 98.1 F | HEART RATE: 79 BPM

## 2021-06-04 VITALS
OXYGEN SATURATION: 97 % | SYSTOLIC BLOOD PRESSURE: 155 MMHG | RESPIRATION RATE: 14 BRPM | BODY MASS INDEX: 39.71 KG/M2 | DIASTOLIC BLOOD PRESSURE: 90 MMHG | HEART RATE: 63 BPM | TEMPERATURE: 97.9 F | WEIGHT: 235 LBS

## 2021-06-04 NOTE — PROGRESS NOTES
S: I have received Nichol's Aisha #809 and #807 bras size 44A. RX is on-file.    A: No assessment was made. She picked up the bras from the [facility] on 12/13/19. She signed the necessary paperwork.    P: She is to call with any further needs.  Goal is to maintain a home program.

## 2021-06-05 NOTE — TELEPHONE ENCOUNTER
Symptoms started yesterday with diarrhea.  Today ha, vomiting.  Chills, sweats.    Last vomit 15 minutes ago.    Gave care advice for stomach flu.    Sharon Nation RN [facility] Triage Nurse Advisor    Reason for Disposition    MILD or MODERATE vomiting (e.g., 1 - 5 times / day)    Protocols used: VOMITING-A-AH

## 2021-06-08 NOTE — PROGRESS NOTES
Impression:  Left foot abrasion healing.  No evidence for infection    Plan:  Soak the wound, Bactroban ointment, Tdap, return if redness swelling drainage fever or other problems      Chief Complaint:  Chief Complaint   Patient presents with     Foot Injury     cut L foot on 5/27 just want a tetanus shot         HPI:   Nichol Gee is a 49 y.o. female who presents to this clinic for the evaluation of abrasion left foot.  Patient was working in the yard 2 days ago when she sustained a cut on the lateral aspect of the left foot on some tayler metal.  Is been many years since she has had a tetanus shot.  There is no numbness or weakness.  The area is slightly tender to the touch.  There is no redness or drainage.  No other injuries and no other complaints      PMH:   Past Medical History:   Diagnosis Date     Breast cancer (H)     11 years     Breast cyst      Hx antineoplastic chemotherapy      Hx of radiation therapy      Past Surgical History:   Procedure Laterality Date     BREAST BIOPSY       HYSTERECTOMY       MASTECTOMY Left      OOPHORECTOMY       OK MASTECTOMY, MODIFIED RADICAL      Description: Modified Radical Mastectomy Left Breast;  Proc Date: 06/28/2007;  Comments: with complete lymph node disection.     OK REMOVAL OF TONSILS,<10 Y/O      Description: Tonsillectomy;  Recorded: 06/13/2007;     OK VAGINAL HYSTERECTOMY,UTERUS 250 GMS/<      Description: Vaginal Hysterectomy;  Recorded: 10/29/2009;  Comments: LAVH with BSO.  Cervix removed.         ROS:  All other systems negative    Meds:    Current Outpatient Medications:      albuterol (PROAIR HFA;PROVENTIL HFA;VENTOLIN HFA) 90 mcg/actuation inhaler, Inhale 2 puffs every 6 (six) hours as needed for wheezing., Disp: 18 g, Rfl: 0     albuterol (PROVENTIL) 2.5 mg /3 mL (0.083 %) nebulizer solution, Take 3 mL (2.5 mg total) by nebulization every 4 (four) hours as needed for wheezing., Disp: 25 vial, Rfl: 2     azithromycin (ZITHROMAX) 250 MG tablet, Take 500  mg (2 x 250 mg tablets) on day 1 followed by 250 mg (1 tablet) on days 2-5., Disp: 6 tablet, Rfl: 0     benzonatate (TESSALON) 200 MG capsule, Take 1 capsule (200 mg total) by mouth 3 (three) times a day as needed., Disp: 20 capsule, Rfl: 0     fluticasone (FLOVENT HFA) 110 mcg/actuation inhaler, Inhale 1 puff 2 (two) times a day. (Patient taking differently: Inhale 1 puff as needed.    ), Disp: 1 Inhaler, Rfl: 12     ibuprofen (ADVIL,MOTRIN) 200 MG tablet, Take 200 mg by mouth every 6 (six) hours as needed for pain. Pt states she uses PRN, Disp: , Rfl:      methylPREDNISolone (MEDROL DOSEPACK) 4 mg tablet, follow package directions, Disp: 21 tablet, Rfl: 0     mupirocin (BACTROBAN) 2 % ointment, Apply to affected area 3 times daily, Disp: 22 g, Rfl: 0     oxiconazole (OXISTAT) 1 % Crea, Apply to affected area on right foot twice daily until clear, Disp: 30 each, Rfl: 2     ranitidine (ZANTAC) 150 MG tablet, Take 1 tablet (150 mg total) by mouth 2 (two) times a day. (Patient taking differently: Take 150 mg by mouth as needed.    ), Disp: 60 tablet, Rfl: 1     triamcinolone (KENALOG) 0.1 % cream, Apply to affected area twice daily as needed., Disp: 30 g, Rfl: 0    Current Facility-Administered Medications:      Tdap injection 0.5 mL, 0.5 mL, Intramuscular, Once, Elder Mi MD        Social:  Social History     Socioeconomic History     Marital status:      Spouse name: Not on file     Number of children: Not on file     Years of education: Not on file     Highest education level: Not on file   Occupational History     Not on file   Social Needs     Financial resource strain: Not on file     Food insecurity     Worry: Not on file     Inability: Not on file     Transportation needs     Medical: Not on file     Non-medical: Not on file   Tobacco Use     Smoking status: Never Smoker     Smokeless tobacco: Never Used   Substance and Sexual Activity     Alcohol use: Yes     Comment: social     Drug use: No      Sexual activity: Never   Lifestyle     Physical activity     Days per week: Not on file     Minutes per session: Not on file     Stress: Not on file   Relationships     Social connections     Talks on phone: Not on file     Gets together: Not on file     Attends Buddhist service: Not on file     Active member of club or organization: Not on file     Attends meetings of clubs or organizations: Not on file     Relationship status: Not on file     Intimate partner violence     Fear of current or ex partner: Not on file     Emotionally abused: Not on file     Physically abused: Not on file     Forced sexual activity: Not on file   Other Topics Concern     Not on file   Social History Narrative     Not on file         Physical Exam:  Vitals:    05/29/20 1240   BP: 137/84   Patient Site: Right Arm   Patient Position: Sitting   Cuff Size: Adult Regular   Pulse: 74   Resp: 16   Temp: 98.3  F (36.8  C)   TempSrc: Oral   SpO2: 97%   Weight: (!) 237 lb 9 oz (107.8 kg)      Vital signs reviewed  Extremities: No tenderness or deformity  Skin: Healing abrasion lateral left foot 2 cm in length.  No open wound.  No drainage.,  Distal capillary refill is normal  Neuro: Normal motor and sensory function in all extremities  Psych: Awake, alert, normally responsive      Results:    No results found for this or any previous visit (from the past 24 hour(s)).    No results found.      Elder Mi MD

## 2021-06-10 NOTE — PROGRESS NOTES
[facility] Progress Note    Patient: Nichol Gee  MRN: [id number]  Date of Service: 8/13/2020        Reason for visit      1. Malignant neoplasm of upper-outer quadrant of left breast in female, estrogen receptor positive (H)        Assessment     1.         A 49 y.o.  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   Subsequent to that, she has been on aromatase inhibitor therapy from 02/2008 to July 2015.  Currently on observation. Slight fibronudularity seen on her recent mammogram.  2.         Poor CYP2D6 metabolism.  3.         Improved bone density.  4.         Some postmenopausal symptoms.    Plan     1.         RTC a year for check up.  She will have yearly mammogram.  2.         Good diet and exercise .  Talked about interventions to reduce weight if possible.  3.         Followup with me in a years' time .  4.         Mammogram on the right side in November.  5.         Advised to followup with me sooner if there is any other problem.      Clinical stage      Infiltrating Lobular Carcinoma Of The Breast Mixed With Other Types Of Carcinoma    Primary site: Breast (Left)    Clinical free text: ER+,IN+,[id number] positive by FISH    Clinical: Stage IIIA (T3, N1, cM0) signed by Jersey Ramos MD on 7/13/2015 11:05 AM    Pathologic: Stage IIIA (T3, N1a, cM0) signed by Jersey Ramos MD on 7/13/2015 11:05 AM    Summary: Stage IIIA (T3, N1a, cM0)      History     Nichol Gee is a very pleasant 49 y.o. 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  Subsequent to  that, she was treated with Taxotere, carboplatin and Herceptin for 6 cycles and subsequent to that, she was put on hormone therapy with tamoxifen but was found to have very poor CYP2D6 metabolism so she was started on aromatase inhibitor after being on Lupron therapy.  Subsequent to that, she underwent a total abdominal hysterectomy in 2009 and was on aromatase inhibitors with anastrozole from 02/2008 to July 2015.  She stopped after that.    She is also known to have osteoporosis and she is unable to tolerate oral bisphosphonates.  She got once a year Zometa. Bone density in 2016 was normal. So Zometa was stopped in 2015.    Comes in today for scheduled follow-up.  She denies any new medical problems.  Continues to do well overall. She has been noticing some tenderness on the right breast laterally. Her mother however is not doing well.  Her dementia is getting worse.      Past Medical History     Past Medical History:   Diagnosis Date     Breast cancer (H)     11 years     Breast cyst      Hx antineoplastic chemotherapy      Hx of radiation therapy        Review of Systems   Constitutional  Constitutional (WDL): All constitutional elements are within defined limits  Neurosensory  Neurosensory (WDL): All neurosensory elements are within defined limits  Eye   Eye Disorder (WDL): All eye disorder elements are within defined limits  Ear  Ear Disorder (WDL): All ear disorder elements are within defined limits  Cardiovascular  Cardiovascular (WDL): All cardiovascular elements are within defined limits  Pulmonary  Respiratory (WDL): Within Defined Limits  Gastrointestinal  Gastrointestinal (WDL): All gastrointestinal elements are within defined limits  Genitourinary  Genitourinary (WDL): All genitourinary elements are within defined limits  Lymphatic  Lymph (WDL): All lymph disorder elements are within defined limits  Musculoskeletal and Connective Tissue  Musculoskeletal and Connetive Tissue Disorders (WDL): All  Musculoskeletal and Connetive Tissue Disorder elements are within defined limits  Integumentary  Integumentary (WDL): All integumentary elements are within defined limits  Patient Coping  Patient Coping: Accepting;Open/discussion  Accompanied by  Accompanied by: Alone  Oral Chemo Adherence           ECOG performance status and Distress Assessment      ECOG Performance:    ECOG Performance Status: 0    Distress Assessment  Distress Assessment Score: No distress:     Pain Status  Currently in Pain: No/denies      Vital Signs     Vitals:    08/13/20 1346   BP: (!) 139/99   Pulse: 79   Temp: 98.1  F (36.7  C)   SpO2: 96%       Physical Exam     GENERAL: No acute distress. Cooperative in conversation.   HEENT: Pupils are equal, round and reactive. Oral mucosa is clean and intact. No ulcerations or mucositis noted. No bleeding noted.  RESP:Chest symmetric lungs are clear bilaterally per auscultation. Regular respiratory rate. No wheezes or rhonchi.  CV: Normal S1 S2 Regular, rate and rhythm. No murmurs.  ABD: Nondistended, soft, nontender. Positive bowel sounds. No organomegaly.   EXTREMITIES: No lower extremity edema.   NEURO: Non- focal. Alert and oriented x3.  Cranial nerves appear intact.  PSYCH: Within normal limits. No depression or anxiety.  SKIN: Warm dry intact.  Radiation skin changes on her left chest wall.  BREAST.  Status post mastectomy with radiation changes on the left side.  Right breast is normal.  She does have slight lumpy breast due to her young age. The point that she is feeling is a rib. She still has a Port-A-Cath in her right upper subclavian vein.  LYMPH NODES: Bilateral cervical, supraclavicular, axillary lymph node examination was done.  Negative for any palpable adenopathy.      Lab Results     Results for orders placed or performed in visit on 11/13/19   Rapid Strep A Screen-Throat    Specimen: Throat   Result Value Ref Range    Rapid Strep A Antigen No Group A Strep detected, presumptive  negative No Group A Strep detected, presumptive negative   Group A Strep, RNA Direct Detection, Throat    Specimen: Throat   Result Value Ref Range    Group A Strep by PCR No Group A Strep rRNA detected No Group A Strep rRNA detected         Imaging Results     No results found.  Study Result     RIGHT FULL FIELD DIGITAL SCREENING MAMMOGRAM     Performed on: 2/27/19           Compared to: 12/27/2017 Mammo Screening Right, 08/14/2017 US Breast Limited (Focal) Right, 12/23/2016 Mammo Screening Right, 12/19/2015 Mammo Screening Right, 12/13/2014 Mammo Screening Right, 11/27/2013 Mammo Diagnostic Right, 11/23/2013 Mammo Screening Right, and 11/17/2012 Mammo Screening Right     Findings: The patient is status post left mastectomy. The right breast has scattered areas of fibroglandular densities. There is no radiographic evidence of malignancy.  This study was evaluated with the assistance of Computer-Aided Detection. Continue routine screening mammogram as recommended.      ACR BI-RADS Category 1: Negative         Jersey Ramos MD

## 2021-06-10 NOTE — PROGRESS NOTES
Patient is here today for provider visit for Malignant neoplasm of upper-outer quadrant of left breast in female, estrogen receptor positive (H).

## 2021-06-11 NOTE — PROGRESS NOTES
Assessment/Plan:        Diagnoses and all orders for this visit:    Anxiety-this is come on over the past week with family problems.   She has had about 2 hours of sleep in the last 8 nights and he is having panic attacks frequently throughout the day.  We discussed taking a long-acting medication which will take a few days to kick in and also taking a short-term medication as needed.  She had diazepam before after she was diagnosed with breast cancer and was waiting for surgery.  She did not have any problems with the medication and not have any problems getting off the medication.  We did discuss the potential of it being addicting.  I believe this will benefit her in getting through the acute phase of this severe anxiety.  -     propranolol (INDERAL) 20 MG tablet; One tablet every morning for five days, then increase to twice daily  Dispense: 60 tablet; Refill: 1  -     diazePAM (VALIUM) 5 MG tablet; Take 1-2 tablets every 6 hours as needed for anxiety attacks.  Dispense: 60 tablet; Refill: 0    Family problems-    Psychophysiological insomnia related to her anxiety in the family situation.-Hopefully the diazepam at bedtime will help with this.      40 minute visit with over 50% spent in education counseling about the above.  She will follow-up with me in a week and sooner if any problems.    Subjective:    Patient ID: Nichol Gee is a 46 y.o. female.    HPI: Patient is having severe anxiety and has slept 2 hours in the last 8 nights.  Her  is with her today.  She keeps him awake all night because she just wants him to hold her.  She gets up and walks around the house all night long and walks up and down the stairs and paces.  She has having panic attacks multiple times every day and is just shaky.  There have been some horrible falls accusations made against her by family members which has her very upset.  She also has a sister who has had to have her heart shocked to restart it several times this week  and who is in the hospital and planning to have surgery tomorrow.  Patient basically just needs sleep.  She can continue to function like this.  She has not been able to work because of the way she is feeling.    The following portions of the patient's history were reviewed and updated as appropriate: allergies, current medications, past family history, past medical history, past social history, past surgical history and problem list.    Review of Systems  12 sys rev neg other than HPI        Objective:    Physical Exam         Patient is obviously distressed, crying and hands are shaking. Vitals are as recorded.  Head and face are normal.  Conjunctiva are clear.  Neck is without adenopathy or masses.resp rate and effort normal. .  Extremities are without edema.  Gait is normal.  Skin is without rashes.  Psych:  anxious

## 2021-06-12 NOTE — PROGRESS NOTES
[facility] Progress Note    Patient: Nichol Gee  MRN: [id number]  Date of Service: 8/14/2017        Reason for visit      1. Breast cancer of upper-outer quadrant of left breast        Assessment     1.         A 46 y.o.  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  By current standards however she would actually be considered HER-2 positive since she had up to 6 copies of HER-2 gene per cells.  Subsequent to that, she has been on aromatase inhibitor therapy from 02/2008 to July 2015.  2.         Poor CYP2D6 metabolism.  3.         Improved bone density.  4.         Significant vaginal dryness type of symptoms.      Plan     1.         RTC a year for check up. Diagnostic Mammo and US of right breast today.  2.         Good diet and exercise .  3.         Followup with me in a years' time .  4.         Mammogram on the right side in November.  5.         Advised to followup with me sooner if there is any other problem.      Clinical stage      Infiltrating Lobular Carcinoma Of The Breast Mixed With Other Types Of Carcinoma    Primary site: Breast (Left)    Clinical free text: ER+,ID+,[id number] positive by FISH    Clinical: Stage IIIA (T3, N1, cM0) signed by Jersey Ramos MD on 7/13/2015 11:05 AM    Pathologic: Stage IIIA (T3, N1a, cM0) signed by Jersey Ramos MD on 7/13/2015 11:05 AM    Summary: Stage IIIA (T3, N1a, cM0)      History     Nichol Gee is a very pleasant 46 y.o. 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  Subsequent to that, she was treated with Taxotere, carboplatin and Herceptin for 6 cycles and subsequent to that, she was put on hormone therapy with tamoxifen but was found to have very poor CYP2D6 metabolism so she was started on aromatase inhibitor after being on Lupron therapy.  Subsequent to that, she underwent a total abdominal hysterectomy in 2009 and had been on aromatase inhibitors with anastrozole since 02/2008 and has finished about 5 years of anastrozole therapy.  Due to her younger age and size of her lesion she continued on aromatase inhibitor therapy.    She is also known to have osteoporosis and she is unable to tolerate oral bisphosphonates.  She got once a year Zometa. Bone density in 2016 was normal. So Zometa was stopped in 2015.        Past Medical History     Past Medical History:   Diagnosis Date     Breast cancer      Breast cyst      Hx antineoplastic chemotherapy      Hx of radiation therapy            Review of Systems   Constitutional  Constitutional (WDL): Exceptions to WDL  Fatigue: Fatigue relieved by rest  Neurosensory  Neurosensory (WDL): All neurosensory elements are within defined limits  Cardiovascular  Cardiovascular (WDL): All cardiovascular elements are within defined limits  Pulmonary  Respiratory (WDL): Exceptions to WDL  Cough: Mild symptoms, nonprescription intervention indicated (asthma)  Gastrointestinal  Gastrointestinal (WDL): All gastrointestinal elements are within defined limits  Genitourinary  Genitourinary (WDL): All genitourinary elements are within defined limits  Integumentary  Integumentary (WDL): All integumentary elements are within defined limits  Patient Coping  Patient Coping: Accepting  Accompanied by  Accompanied by: Alone    ECOG performance status and Distress Assessment      ECOG Performance:    ECOG Performance Status: 0    Distress Assessment  Distress  Assessment Score: No distress:     Pain Status  Currently in Pain: No/denies        Vital Signs     Vitals:    08/14/17 1312   BP: 137/65   Pulse: 74   Temp: 98.7  F (37.1  C)   SpO2: 97%       Physical Exam     GENERAL: No acute distress. Cooperative in conversation.   HEENT: Pupils are equal, round and reactive. Oral mucosa is clean and intact. No ulcerations or mucositis noted. No bleeding noted.  RESP:Chest symmetric lungs are clear bilaterally per auscultation. Regular respiratory rate. No wheezes or rhonchi.  CV: Normal S1 S2 Regular, rate and rhythm. No murmurs.  ABD: Nondistended, soft, nontender. Positive bowel sounds. No organomegaly.   EXTREMITIES: No lower extremity edema.   NEURO: Non- focal. Alert and oriented x3.  Cranial nerves appear intact.  PSYCH: Within normal limits. No depression or anxiety.  SKIN: Warm dry intact.    LYMPH NODES: Bilateral cervical, supraclavicular, axillary lymph node examination was done.  Negative for any palpable adenopathy.  BREAST: s/p mastectomy left side, with radiation skin changes. Right breast has slight lumpiness, which is mostly on the lower half around the nipple. Small lump felt way out below the axilla. Feels like a cyst. Non tender.      Lab Results     Results for orders placed or performed in visit on 07/26/17   Urinalysis-UC if Indicated   Result Value Ref Range    Color, UA Yellow Colorless, Yellow, Straw, Light Yellow    Clarity, UA Clear Clear    Glucose, UA Negative Negative    Bilirubin, UA Moderate (!) Negative    Ketones, UA Negative Negative    Specific Gravity, UA 1.010 1.005 - 1.030    Blood, UA Negative Negative    pH, UA 7.0 5.0 - 8.0    Protein, UA Trace (!) Negative mg/dL    Urobilinogen, UA 2.0 E.U./dL (!) 0.2 E.U./dL, 1.0 E.U./dL    Nitrite, UA Negative Negative    Leukocytes, UA Negative Negative    Bacteria, UA Few (!) None Seen hpf    RBC, UA None Seen None Seen, 0-2 hpf    WBC, UA None Seen None Seen, 0-5 hpf    Squam Epithel, UA 0-5  None Seen, 0-5 lpf         Imaging Results     Xr Chest Pa And Lateral    Result Date: 7/26/2017  XR CHEST PA AND LATERAL 7/26/2017 1:52 PM INDICATION: Fever, unspecified COMPARISON: 5/10/2016. FINDINGS: Catheter tubing projects over the right chest, similar to previous. Heart size appears normal. Lungs appear clear. No acute abnormality seen. This report was electronically interpreted by: Dr. José Palmer MD ON 07/26/2017 at 13:57        Jersey Ramos MD

## 2021-06-12 NOTE — PROGRESS NOTES
Subjective:      Patient ID: Nichol Gee is a 46 y.o. female.    Chief Complaint:    HPI  Patient comes in for evaluation of fever, chills and fatigue that started yesterday and continues today.  She had a temperature up to 102  yesterday, but not today.  She had taken some ibuprofen a couple times yesterday and the last dose was yesterday evening.  She is also had some intermittent sharp stabbing pain under her right breast.  No new cough (she has a slight morning cough daily).  She denies dysuria.    She had a graduation party for 1 of her children last weekend and she is exposed to many people.  The only when she knew had been sick was her mother-in-law which had just gotten out of the hospital for pneumonia.    Past history is significant for pneumonia couple times and left mastectomy secondary to breast cancer.    Past Medical History:   Diagnosis Date     Breast cancer      Breast cyst      Hx antineoplastic chemotherapy      Hx of radiation therapy        Past Surgical History:   Procedure Laterality Date     BREAST BIOPSY       HYSTERECTOMY       MASTECTOMY Left      OOPHORECTOMY       [place] MASTECTOMY, MODIFIED RADICAL      Description: Modified Radical Mastectomy Left Breast;  Proc Date: 06/28/2007;  Comments: with complete lymph node disection.     [place] REMOVAL OF TONSILS,<10 Y/O      Description: Tonsillectomy;  Recorded: 06/13/2007;     [place] VAGINAL HYSTERECTOMY,UTERUS 250 GMS/<      Description: Vaginal Hysterectomy;  Recorded: 10/29/2009;  Comments: DONG with BSO.  Cervix removed.       Family History   Problem Relation Age of Onset     Cancer Mother      Breast cancer Mother      Stroke Mother 77     2 strokes 8/15, stroke 10/15.  Not disabling,  memory loss     Clotting disorder Mother      Diabetes Mother      Hypertension Mother      Diabetes Father      Stroke Father      Diabetes Sister      Breast cancer Paternal Aunt      Diabetes Sister      Diabetes Sister      Heart attack Sister      Breast  cancer Paternal Aunt      Asthma Son      Asthma Daughter        Social History   Substance Use Topics     Smoking status: Never Smoker     Smokeless tobacco: Never Used     Alcohol use Yes       Review of Systems    Objective:     /80  Pulse 72  Temp 98.3  F (36.8  C) (Oral)   Resp 16  Wt (!) 225 lb 14.4 oz (102.5 kg)  SpO2 97%  BMI 37.59 kg/m2    Physical Exam   Constitutional:   Slightly ill appearing.    HENT:   Right Ear: External ear normal.   Left Ear: External ear normal.   Nose: Nose normal.   Mouth/Throat: Oropharynx is clear and moist.   Eyes: Conjunctivae are normal.   Neck: Neck supple.   Cardiovascular: Normal rate.    Pulmonary/Chest: Effort normal and breath sounds normal. No respiratory distress.   Abdominal: Soft. She exhibits no distension. There is no tenderness.   No flank pain to percussion.   Musculoskeletal: Normal range of motion.   Lymphadenopathy:     She has no cervical adenopathy.   Neurological: She is alert.   Skin: Skin is warm and dry. No pallor.   Normal color without jaundice.       Recent Results (from the past 24 hour(s))   Urinalysis-UC if Indicated   Result Value Ref Range    Color, UA Yellow Colorless, Yellow, Straw, Light Yellow    Clarity, UA Clear Clear    Glucose, UA Negative Negative    Bilirubin, UA Moderate (!) Negative    Ketones, UA Negative Negative    Specific Gravity, UA 1.010 1.005 - 1.030    Blood, UA Negative Negative    pH, UA 7.0 5.0 - 8.0    Protein, UA Trace (!) Negative mg/dL    Urobilinogen, UA 2.0 E.U./dL (!) 0.2 E.U./dL, 1.0 E.U./dL    Nitrite, UA Negative Negative    Leukocytes, UA Negative Negative    Bacteria, UA Few (!) None Seen hpf    RBC, UA None Seen None Seen, 0-2 hpf    WBC, UA None Seen None Seen, 0-5 hpf    Squam Epithel, UA 0-5 None Seen, 0-5 lpf       Chest x-ray: I reviewed the images.  No signs of pneumonia and lungs are fully expanded. Tubing from her chemo port noted on the right side.     Xr Chest Pa And Lateral    Result  Date: 7/26/2017  XR CHEST PA AND LATERAL 7/26/2017 1:52 PM INDICATION: Fever, unspecified COMPARISON: 5/10/2016. FINDINGS: Catheter tubing projects over the right chest, similar to previous. Heart size appears normal. Lungs appear clear. No acute abnormality seen. This report was electronically interpreted by: Dr. José Palmer MD ON 07/26/2017 at 13:57           Assessment and Plan:     Procedures    The primary encounter diagnosis was Fever. A diagnosis of Bilirubin in urine was also pertinent to this visit.       Fever yesterday that is not present today.  No signs of urinary tract infection or pneumonia.  She otherwise looks well and is afebrile.    Incidental elevated urine bilirubin.  Patient is not having any symptoms of liver or gallbladder disease.  She does not appear to be jaundiced.  Abdomen is nontender.  I recommended having this urine rechecked in a couple weeks to make sure that this is normalized.  Otherwise she will need to be worked up for liver disease.      Patient Instructions     No fever at this time.    No signs or a UTI or pneumonia    Elevated Bilirubin in the urine. Have the urine retested in a couple weeks.     Follow up if symptoms worsen.

## 2021-06-15 NOTE — PROGRESS NOTES
Assessment / Impression     1. Somatic dysfunction of cervical region     2. Somatic dysfunction of thoracic region     3. Skin lesion of right lower extremity     4. Headache           Plan:     I performed osteopathic manipulation to the cervical and thoracic regions.  Techniques included soft tissue myofascial release, suboccipital release, muscle energy and HVLA.  She tolerated these procedures well.  She may use ice, heat, Tylenol or ibuprofen as needed.  She was given a prescription for triamcinolone cream to use on the patch of itchy skin on the back of her right leg.  If this does not improve she may return to the clinic.    Subjective:      HPI: Nichol Gee is a 47 y.o. female who presents to the clinic to be evaluated for neck and upper back pain symptoms.  She is interested in osteopathic manipulation which has been helpful for these issues in the past.  She reports that her current symptoms have been bothering her over the past several days.  She has been under increased stress which she feels like is contributing to the discomfort in her neck and upper back as well as headaches.  She denies having symptoms radiating down her arms.  She denies any history of trauma or surgery that involved the neck or back.    She has a dry patch of itchy skin in the back of her right leg.  She has used hydrocortisone for similar lesions in the past which was helpful.        Review of Systems  All other systems reviewed and are negative.     History   Smoking Status     Never Smoker   Smokeless Tobacco     Never Used       Family History   Problem Relation Age of Onset     Cancer Mother      Breast cancer Mother      Stroke Mother 77     2 strokes 8/15, stroke 10/15.  Not disabling,  memory loss     Clotting disorder Mother      Diabetes Mother      Hypertension Mother      Diabetes Father      Stroke Father      Diabetes Sister      Breast cancer Paternal Aunt      Diabetes Sister      Diabetes Sister      Heart  "attack Sister      Breast cancer Paternal Aunt      Asthma Son      Asthma Daughter        Objective:     /76 (Patient Site: Left Arm, Patient Position: Sitting, Cuff Size: Adult Large)  Pulse 68  Temp 98.3  F (36.8  C) (Oral)   Resp 16  Ht 5' 4.5\" (1.638 m)  Wt (!) 232 lb (105.2 kg)  BMI 39.21 kg/m2  Physical Examination: General appearance - alert, well appearing, and in no distress  Neck: supple, no significant adenopathy.  Nontender over the cervical spinous processes.  Rotation is slightly restricted to the right.  C2-C5 rotated left, sidebent left  Lungs: clear to auscultation, no wheezes, rales or rhonchi, symmetric air entry  Heart: normal rate, regular rhythm, normal S1, S2, no murmurs, rubs, clicks or gallops  Neurological: alert, oriented, normal speech, no focal findings or movement disorder noted.   Back: Nontender of the thoracic and lumbar spinous processes.  She is tender in the paraspinal musculature throughout the upper back and paraspinal muscles in the thoracic region. T2-T7 rotated right, sidebent left  Extremities: No edema, no clubbing or cyanosis  Skin: There is a dry patch of lightly erythematous skin on the back of her right leg is approximately the size of a quarter.  No results found for this or any previous visit (from the past 168 hour(s)).    Current Outpatient Prescriptions   Medication Sig     albuterol (PROVENTIL HFA;VENTOLIN HFA) 90 mcg/actuation inhaler Inhale 2 puffs every 6 (six) hours as needed for wheezing.     fluticasone (FLOVENT HFA) 110 mcg/actuation inhaler Inhale 1 puff 2 (two) times a day.     ibuprofen (ADVIL,MOTRIN) 200 MG tablet Take 200 mg by mouth every 6 (six) hours as needed for pain. Pt states she uses PRN     cholecalciferol, vitamin D3, (VITAMIN D3) 5,000 unit Tab Take 1 tablet by mouth daily.     diazePAM (VALIUM) 5 MG tablet Take 1-2 tablets every 6 hours as needed for anxiety attacks.     propranolol (INDERAL) 20 MG tablet One tablet every " morning for five days, then increase to twice daily     triamcinolone (KENALOG) 0.1 % cream Apply to affected area twice daily as needed.

## 2021-06-17 NOTE — PATIENT INSTRUCTIONS - HE
Patient Instructions by Ju Rivero CNP at 3/5/2019  4:40 PM     Author: Ju Rivero CNP Service: -- Author Type: Nurse Practitioner    Filed: 3/5/2019  6:56 PM Encounter Date: 3/5/2019 Status: Addendum    : Ju Rivero CNP (Nurse Practitioner)    Related Notes: Original Note by Ju Rivero CNP (Nurse Practitioner) filed at 3/5/2019  6:56 PM       You are having common cold with an asthma exacerbation.  Restart your Flovent until you are feeling much better.  You can use your Ventolin inhaler 2 puffs every 6 hours as needed for shortness of breath or wheezing.  Return if this does not control your symptoms.      Patient Education     Asthma (Adult)  Asthma is a disease where the medium and  small air passages within the lung go into spasm and restrict the flow of air. Inflammation and swelling of the airways cause further blockage. During an acute asthma attack, these factors cause trouble breathing, wheezing, cough and chest tightness.    An asthma attack can be triggered by many things. Common triggers include infections such as the common cold, bronchitis, and pneumonia. Irritants such as smoke or pollutants in the air, very cold air, emotional upset, and exercise can also trigger an attack. In many adults with asthma, allergies to dust, mold, pollen and animal dander can cause an asthma attack. Skipping doses of daily asthma medicine can also bring on an asthma attack.  Asthma can be controlled using the proper medicines prescribed by your healthcare provider and avoiding exposure to known triggers including allergens and irritants.  Home care    Take prescribed medicine exactly at the times advised. If you need medicine such as from a hand held inhaler or aerosol breathing machine more than every 4 hours, contact your healthcare provider or seek immediate medical attention. If prescribed an antibiotic or prednisone, take all of the medicine as prescribed, even if you are feeling  "better after a few days.    Don't smoke. Avoid being exposed to the smoke of others.    Some people with asthma have worsening of their symptoms when they take aspirin and non-steroidal or fever-reducing medicines like ibuprofen and naproxen. Talk to your healthcare provider if you think this may apply to you.  Follow-up care  Follow up with your healthcare provider, or as advised. Always bring all of your current medicines to any appointments with your healthcare provider. Also bring a complete list of medicines even those not taken for asthma. If you don't already have one, talk to your healthcare provider about developing your own \"Asthma Action Plan.\"  A pneumococcal (pneumonia) vaccine and yearly flu shot (every fall) are recommended. Ask your doctor about this.  When to seek medical advice  Call your healthcare provider right away if any of these occur:     Increased wheezing or shortness of breath    Need to use your inhalers more often than usual without relief    Fever of 100.4 F (38 C) or higher, or as directed by your healthcare provider    Coughing up lots of dark-colored or bloody sputum (mucus)    Chest pain with each breath    If you use a peak flow meter as part of an Asthma Action Plan, and you are still in the yellow zone (50% to 80%) 15 minutes after using inhaler medicine.  Call 911  Call 911 if any of the following occur    Trouble walking or talking because of shortness of breath    If you use a peak flow meter as part of an Asthma Action Plan and you are still in the red zone (less than 50%) 15 minutes after using inhaler medicine    Lips or fingernails turning gray or blue  Date Last Reviewed: 5/1/2017 2000-2017 [employer]. [address] [place] [address], [place] [address]. All rights reserved. This information is not intended as a substitute for professional medical care. Always follow your healthcare professional's instructions.                "

## 2021-06-17 NOTE — PATIENT INSTRUCTIONS - HE
Patient Instructions by Ye Santillan DO at 11/24/2019 10:40 AM     Author: Ye Santillan DO Service: -- Author Type: Physician    Filed: 11/24/2019 11:00 AM Encounter Date: 11/24/2019 Status: Addendum    : Ye Santillan DO (Physician)    Related Notes: Original Note by Ye Santillan DO (Physician) filed at 11/24/2019 11:00 AM       See hand out for treatment advice.     Patient Education     Bronchitis, Antibiotic Treatment (Adult)    Bronchitis is an infection of the air passages (bronchial tubes) in your lungs. It often occurs when you have a cold. This illness is contagious during the first few days and is spread through the air by coughing and sneezing, or by direct contact (touching the sick person and then touching your own eyes, nose, or mouth).  Symptoms of bronchitis include cough with mucus (phlegm) and low-grade fever. Bronchitis usually lasts 7 to 14 days. Mild cases can be treated with simple home remedies. More severe infection is treated with an antibiotic.  Home care  Follow these guidelines when caring for yourself at home:    If your symptoms are severe, rest at home for the first 2 to 3 days. When you go back to your usual activities, don't let yourself get too tired.    Do not smoke. Also avoid being exposed to secondhand smoke.    You may use over-the-counter medicines to control fever or pain, unless another medicine was prescribed. If you have chronic liver or kidney disease or have ever had a stomach ulcer or gastrointestinal bleeding, talk with your healthcare provider before using these medicines. Also talk to your provider if you are taking medicine to prevent blood clots. Aspirin should never be given to anyone younger than 18 years of age who is ill with a viral infection or fever. It may cause severe liver or brain damage.    Your appetite may be poor, so a light diet is fine. Avoid dehydration by drinking 6 to 8 glasses of fluids per day (such as water, soft drinks,  sports drinks, juices, tea, or soup). Extra fluids will help loosen secretions in the nose and lungs.    Over-the-counter cough, cold, and sore-throat medicines will not shorten the length of the illness, but they may be helpful to reduce symptoms. (Note: Do not use decongestants if you have high blood pressure.)    Finish all antibiotic medicine. Do this even if you are feeling better after only a few days.  Follow-up care  Follow up with your healthcare provider, or as advised. If you had an X-ray or ECG (electrocardiogram), a specialist will review it. You will be notified of any new findings that may affect your care.  If you are age 65 or older, or if you have a chronic lung disease or condition that affects your immune system, or you smoke, ask your healthcare provider about getting a pneumococcal vaccine and a yearly flu shot (influenza vaccine).  When to seek medical advice  Call your healthcare provider right away if any of these occur:    Fever of 100.4 F (38 C) or higher, or as directed by your healthcare provider    Coughing up increased amounts of colored sputum    Weakness, drowsiness, headache, facial pain, ear pain, or a stiff neck  Call 911  Call 911 if any of these occur.    Coughing up blood    Worsening weakness, drowsiness, headache, or stiff neck    Trouble breathing, wheezing, or pain with breathing  Date Last Reviewed: 9/13/2015 2000-2017 [employer]. [address] [place] [id number]. All rights reserved. This information is not intended as a substitute for professional medical care. Always follow your healthcare professional's instructions.

## 2021-06-18 NOTE — PATIENT INSTRUCTIONS - HE
Patient Instructions by Elder Mi MD at 5/29/2020 12:30 PM     Author: Elder Mi MD Service: -- Author Type: Physician    Filed: 5/29/2020 12:49 PM Encounter Date: 5/29/2020 Status: Signed    : Elder Mi MD (Physician)         Patient Education     Old Laceration: Not Sutured  A laceration is a cut through the skin. This will usually require stitches if it is deep. However, if a laceration remains open for too long, the risk of infection increases. In your case, too much time has passed before coming for treatment. The danger of infection from stitching at this time is too high. That is why your wound was not stitched.  If the wound is spread open, it will heal by filling in from the bottom and sides. A wound that is not stitched may take 1 to 4 weeks to heal, depending on the size of the opening. You will probably have a visible scar. You can discuss revision of the scar with your healthcare provider at a later time.  Home care  The following guidelines will help you care for your laceration at home:    Keep the wound clean and dry. If a bandage was applied and it becomes wet or dirty, replace it. Otherwise, leave it in place for the first 24 hours, then change it once a day or as directed.    The healthcare provider may prescribe an antibiotic cream or ointment to prevent infection.     If you are at high risk for infection, or the wound is very dirty, your provider may prescribe an oral antibiotic medicine to prevent infection. Don't stop using this medicine until you have finished it all, or the provider tells you to stop.    The healthcare provider may also prescribe medicine for pain. Follow instructions for taking these medicines.  Clean the wound daily:    After removing any bandage, wash the area with soap and water. Use a wet cotton swab to loosen and remove any blood or crust that forms.    Talk with your healthcare provider before applying any antibiotic ointment to the  wound. Reapply a fresh bandage.    You may remove the bandage to shower as usual after the first 24 hours, but don't soak the area in water. This means no tub baths or swimming until the wound heals or your provider tells you it's OK.  Don't do activities that may reinjure your wound.    Check the wound daily for signs of infection listed below.  Follow-up care  Follow up with your healthcare provider, or as advised.  When to seek medical advice  Call your healthcare provider right away if any of these occur:    Wound bleeding not controlled by direct pressure    Signs of infection, including increasing pain in the wound, increasing wound redness or swelling, or pus or bad odor coming from the wound    Fever of 100.4 F (38. C) or higher, or as directed by your healthcare provider    Wound edges reopen    Wound changes colors    Numbness around the wound     Decreased movement around the injured area  Date Last Reviewed: 7/1/2017 2000-2017 [employer]. [address] [place] [address], [place] [id number]. All rights reserved. This information is not intended as a substitute for professional medical care. Always follow your healthcare professional's instructions.

## 2021-06-19 NOTE — PROGRESS NOTES
Assessment / Impression     1. Skin tags, multiple acquired     2. Reactive airway disease           Plan:     Given patient now has multiple skin tags that have become bothersome and painful, is appropriate for skin tag removal.  Procedure was discussed with patient, counseling was provided.  Please see procedure note below for more details.  Warned patient about possible signs of infection, she should call or clinic or be seen immediately if she does know any of these signs.  Follow-up as needed.    Reactive airway disease: See ACT completed today.  Sx well controlled.  Continue current meds.    Subjective:      HPI: Nichol Gee is a 47 y.o. female, new to me, who presents for skin tag concern, and request for skin tag removal.  Patient states she has multiple skin tags, mostly in her neck, axilla, breast area, and one in her right medial thigh, that, over time, has become more bothersome, and getting caught on clothes.  She is previously had multiple skin tags removed several years ago, which has helped.  Over time, she has progressively had more.  Not taking aspirin or any other blood thinners.  No fevers.        Medical History:     Patient Active Problem List   Diagnosis     Hyperlipidemia     Varicose Veins     Costochondritis (Tietze's Syndrome)     Wheezing (Symptom)     Groin (Inguinal) Pain Left Side     Breast cancer of upper-outer quadrant of left breast     Rosacea     Drug Toxicity     H/O mastectomy, left     Family history of diabetes mellitus     Obesity       Past Medical History:   Diagnosis Date     Breast cancer (H)      Breast cyst      Hx antineoplastic chemotherapy      Hx of radiation therapy        Past Surgical History:   Procedure Laterality Date     BREAST BIOPSY       HYSTERECTOMY       MASTECTOMY Left      OOPHORECTOMY       LA MASTECTOMY, MODIFIED RADICAL      Description: Modified Radical Mastectomy Left Breast;  Proc Date: 06/28/2007;  Comments: with complete lymph node  disection.     MT REMOVAL OF TONSILS,<12 Y/O      Description: Tonsillectomy;  Recorded: 06/13/2007;     MT VAGINAL HYSTERECTOMY,UTERUS 250 GMS/<      Description: Vaginal Hysterectomy;  Recorded: 10/29/2009;  Comments: LAVH with BSO.  Cervix removed.       Current Medications:     Current Outpatient Prescriptions   Medication Sig     albuterol (PROAIR HFA;PROVENTIL HFA;VENTOLIN HFA) 90 mcg/actuation inhaler Inhale 2 puffs every 6 (six) hours as needed for wheezing.     albuterol (PROVENTIL) 2.5 mg /3 mL (0.083 %) nebulizer solution Take 3 mL (2.5 mg total) by nebulization every 4 (four) hours as needed for wheezing.     diazePAM (VALIUM) 5 MG tablet Take 1-2 tablets every 6 hours as needed for anxiety attacks.     fluticasone (FLOVENT HFA) 110 mcg/actuation inhaler Inhale 1 puff 2 (two) times a day.     ibuprofen (ADVIL,MOTRIN) 200 MG tablet Take 200 mg by mouth every 6 (six) hours as needed for pain. Pt states she uses PRN     propranolol (INDERAL) 20 MG tablet One tablet every morning for five days, then increase to twice daily     triamcinolone (KENALOG) 0.1 % cream Apply to affected area twice daily as needed.     cholecalciferol, vitamin D3, (VITAMIN D3) 5,000 unit Tab Take 1 tablet by mouth daily.       Family History:     Family History   Problem Relation Age of Onset     Cancer Mother      Breast cancer Mother      Stroke Mother 77     2 strokes 8/15, stroke 10/15.  Not disabling,  memory loss     Clotting disorder Mother      Diabetes Mother      Hypertension Mother      Diabetes Father      Stroke Father      Diabetes Sister      Breast cancer Paternal Aunt      Diabetes Sister      Diabetes Sister      Heart attack Sister      Breast cancer Paternal Aunt      Asthma Son      Asthma Daughter        Review of Systems  All other systems reviewed and are negative.         Social History:     History   Smoking Status     Never Smoker   Smokeless Tobacco     Never Used     Social History     Social History  [place]ash   works at [employer] as a .        Objective:     /62 (Patient Site: Right Arm, Patient Position: Sitting, Cuff Size: Adult Large)  Pulse 66  Physical Examination: General appearance - alert, well appearing, and in no distress  Eyes: pupils equal and reactive, extraocular eye movements intact  Skin: Multiple skin tags noted along patient's anterior neck, right and left shoulder, right axilla, right lower chest inferior to her breast, and on right medial thigh.  Psychiatric: Normal affect. Does not appear anxious or depressed.    No results found for this or any previous visit (from the past 168 hour(s)).    ACT=22, see flowsheet for details.      Tanesha Casiano MD  7/17/2018  12:50 PM    PROCEDURE NOTE:  Skin tag removal    Indications for Procedure:  Multiple skin tags noted as outlined in exam above, have become bothersome, catching on clothing, painful.      Location of Lesion:  See exam above, multiple locations.      Procedure:  The proposed procedure was explained to the patient. Risks, side effects, benefits, and  alternatives were discussed. All questions were answered to the patient's satisfaction, who gave verbal and written informed consent.    Patient reclined on the table. Skin tags were located and clensed with Betadine solution. A total of 17 skin tags were removed with scissors and forceps.  Hemostasis was obtained with direct pressure, and on certain lesions, aluminum chloride was also used.  Area was cleaned, dressed with bacitracin and Band-Aids.    The patient was instructed to report any fever, redness, or bleeding. Recheck p.r.n.    Tanesha Casiano MD

## 2021-06-19 NOTE — PROGRESS NOTES
[facility] Progress Note    Patient: Nichol Gee  MRN: [id number]  Date of Service: 8/16/2018        Reason for visit      1. Malignant neoplasm of upper-outer quadrant of left breast in female, estrogen receptor positive (H)        Assessment     1.         A 47 y.o.  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  By current standards however she would actually be considered HER-2 positive since she had up to 6 copies of HER-2 gene per cells.  Subsequent to that, she has been on aromatase inhibitor therapy from 02/2008 to July 2015.  Currently on observation.  2.         Poor CYP2D6 metabolism.  3.         Improved bone density.  4.         Some postmenopausal symptoms.      Plan     1.         RTC a year for check up.  She will have yearly mammogram.  2.         Good diet and exercise .  Talked about interventions to reduce weight if possible.  3.         Followup with me in a years' time .  4.         Mammogram on the right side in November.  5.         Advised to followup with me sooner if there is any other problem.      Clinical stage      Infiltrating Lobular Carcinoma Of The Breast Mixed With Other Types Of Carcinoma    Primary site: Breast (Left)    Clinical free text: ER+,AR+,[id number] positive by FISH    Clinical: Stage IIIA (T3, N1, cM0) signed by Jersey Ramos MD on 7/13/2015 11:05 AM    Pathologic: Stage IIIA (T3, N1a, cM0) signed by Jersey Ramos MD on 7/13/2015 11:05 AM    Summary: Stage IIIA (T3, N1a, cM0)      History     Nichol Gee is a very pleasant 47 y.o. 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  Subsequent to that, she was treated with Taxotere, carboplatin and Herceptin for 6 cycles and subsequent to that, she was put on hormone therapy with tamoxifen but was found to have very poor CYP2D6 metabolism so she was started on aromatase inhibitor after being on Lupron therapy.  Subsequent to that, she underwent a total abdominal hysterectomy in 2009 and was on aromatase inhibitors with anastrozole from 02/2008 to July 2015.  She stopped after that.    She is also known to have osteoporosis and she is unable to tolerate oral bisphosphonates.  She got once a year Zometa. Bone density in 2016 was normal. So Zometa was stopped in 2015.    Comes in today for scheduled follow-up.    Past Medical History     Past Medical History:   Diagnosis Date     Breast cancer (H)      Breast cyst      Hx antineoplastic chemotherapy      Hx of radiation therapy            Review of Systems   Constitutional  Constitutional (WDL): Exceptions to WDL  Fatigue: Fatigue relieved by rest  Weight Gain: 5 - <10% from baseline (11 lbs up from a year ago. )  Neurosensory  Neurosensory (WDL): Exceptions to WDL  Peripheral Sensory Neuropathy: Asymptomatic, loss of deep tendon reflexes or paresthesia (Rt hand: tingling and some numbness. x 1 week now. )  Cardiovascular  Cardiovascular (WDL): Exceptions to WDL  Edema: Yes (Reports a little swelling to ankles. )  Pulmonary  Respiratory (WDL): Exceptions to WDL  Cough: Mild symptoms, nonprescription intervention indicated (Hx: asthma.)  Gastrointestinal  Gastrointestinal (WDL): All gastrointestinal elements are within defined limits  Genitourinary  Genitourinary (WDL): All genitourinary elements are within defined limits  Integumentary  Integumentary (WDL): All integumentary  elements are within defined limits  Patient Coping  Patient Coping: Accepting  Accompanied by  Accompanied by: Alone    ECOG performance status and Distress Assessment      ECOG Performance:    ECOG Performance Status: 0    Distress Assessment  Distress Assessment Score: No distress:     Pain Status  Currently in Pain: No/denies      Vital Signs     Vitals:    08/16/18 1520   BP: 135/84   Pulse: 74   Temp: 98.3  F (36.8  C)   SpO2: 96%       Physical Exam     GENERAL: No acute distress. Cooperative in conversation.   HEENT: Pupils are equal, round and reactive. Oral mucosa is clean and intact. No ulcerations or mucositis noted. No bleeding noted.  RESP:Chest symmetric lungs are clear bilaterally per auscultation. Regular respiratory rate. No wheezes or rhonchi.  CV: Normal S1 S2 Regular, rate and rhythm. No murmurs.  ABD: Nondistended, soft, nontender. Positive bowel sounds. No organomegaly.   EXTREMITIES: No lower extremity edema.   NEURO: Non- focal. Alert and oriented x3.  Cranial nerves appear intact.  PSYCH: Within normal limits. No depression or anxiety.  SKIN: Warm dry intact.    LYMPH NODES: Bilateral cervical, supraclavicular, axillary lymph node examination was done.  Negative for any palpable adenopathy.      Lab Results     Results for orders placed or performed in visit on 08/14/18   Comprehensive Metabolic Panel   Result Value Ref Range    Sodium 143 136 - 145 mmol/L    Potassium 4.4 3.5 - 5.0 mmol/L    Chloride 106 98 - 107 mmol/L    CO2 23 22 - 31 mmol/L    Anion Gap, Calculation 14 5 - 18 mmol/L    Glucose 104 70 - 125 mg/dL    BUN 11 8 - 22 mg/dL    Creatinine 0.80 0.60 - 1.10 mg/dL    GFR MDRD Af Amer >60 >60 mL/min/1.73m2    GFR MDRD Non Af Amer >60 >60 mL/min/1.73m2    Bilirubin, Total 0.4 0.0 - 1.0 mg/dL    Calcium 9.8 8.5 - 10.5 mg/dL    Protein, Total 7.1 6.0 - 8.0 g/dL    Albumin 3.8 3.5 - 5.0 g/dL    Alkaline Phosphatase 111 45 - 120 U/L    AST 18 0 - 40 U/L    ALT 16 0 - 45 U/L   HM1 (CBC  with Diff)   Result Value Ref Range    WBC 9.7 4.0 - 11.0 thou/uL    RBC 5.13 3.80 - 5.40 mill/uL    Hemoglobin 13.1 12.0 - 16.0 g/dL    Hematocrit 40.1 35.0 - 47.0 %    MCV 78 (L) 80 - 100 fL    MCH 25.5 (L) 27.0 - 34.0 pg    MCHC 32.7 32.0 - 36.0 g/dL    RDW 13.9 11.0 - 14.5 %    Platelets 324 140 - 440 thou/uL    MPV 7.4 7.0 - 10.0 fL    Neutrophils % 63 50 - 70 %    Lymphocytes % 27 20 - 40 %    Monocytes % 6 2 - 10 %    Eosinophils % 3 0 - 6 %    Basophils % 1 0 - 2 %    Neutrophils Absolute 6.1 2.0 - 7.7 thou/uL    Lymphocytes Absolute 2.6 0.8 - 4.4 thou/uL    Monocytes Absolute 0.6 0.0 - 0.9 thou/uL    Eosinophils Absolute 0.3 0.0 - 0.4 thou/uL    Basophils Absolute 0.1 0.0 - 0.2 thou/uL         Imaging Results     No results found.      Jersey Ramos MD

## 2021-06-27 NOTE — PROGRESS NOTES
Progress Notes by Ju Rivero CNP at 3/5/2019  4:40 PM     Author: Ju Rivero CNP Service: -- Author Type: Nurse Practitioner    Filed: 3/5/2019  7:04 PM Encounter Date: 3/5/2019 Status: Signed    : Ju Rivero CNP (Nurse Practitioner)       Chief Complaint   Patient presents with   ? Cough     x 3 days s.o.b. with activity    ? Sore Throat     x 3 days        ASSESSMENT & PLAN:   Diagnoses and all orders for this visit:    Exacerbation of intermittent asthma, unspecified asthma severity  -     Discontinue: albuterol nebulizer solution 3 mL (PROVENTIL)  -     albuterol (PROAIR HFA;PROVENTIL HFA;VENTOLIN HFA) 90 mcg/actuation inhaler  Dispense: 18 g; Refill: 0    Throat pain  -     Rapid Strep A Screen-Throat  -     Group A Strep, RNA Direct Detection, Throat    Viral URI with cough    Other orders  -     albuterol nebulizer solution 3 mL (PROVENTIL)        MDM:  Forced expiration test about 90% less wheezy after 1 nebulizer given in the clinic.  Patient says she feels much better.  Recommended restarting Flovent until feeling better and then using Ventolin 2 puffs every 6 hours as needed.  Refill for this medication provided.  Do not believe she requires steroids at this point, has nonlabored breathing, speaking in full sentences, and has no wheezing except mild wheezing with forced expiration after the one nebulizer.    Supportive care discussed.  See discharge instructions below for specific recommendations given.    At the end of the encounter, I discussed results, diagnosis, medications. Discussed red flags for immediate return to clinic/ER, as well as indications for follow up if no improvement. Patient and/or caregiver understood and agreed to plan. Patient was stable for discharge.    SUBJECTIVE    HPI:  Coughing for 3 days.  Some shortness of breath with activity which she thinks is primarily due to coughing.      Hx of asthma on flovent and ventolin.Usually fine unless has URI. Last  "flovent was months ago.  No Ventolin lately either.              History obtained from the patient.    Past Medical History:   Diagnosis Date   ? Breast cancer (H)     11 years   ? Breast cyst    ? Hx antineoplastic chemotherapy    ? Hx of radiation therapy        Problem List:  2016-08: H/O mastectomy, left  2016-08: Family history of diabetes mellitus  2016-08: Obesity  2007-06: Breast cancer of upper-outer quadrant of left breast  Hyperlipidemia  Varicose Veins  Costochondritis (Tietze's Syndrome)  Acute bronchitis  Wheezing (Symptom)  Xerosis Cutis  Contusion Of The Finger(S) With Intact Skin Surface  Malignant Female Breast Neoplasm Of The Lower Outer Quadrant  Groin (Inguinal) Pain Left Side  Rosacea  Anemia  Drug Toxicity      Social History     Tobacco Use   ? Smoking status: Never Smoker   ? Smokeless tobacco: Never Used   Substance Use Topics   ? Alcohol use: Yes       Review of Systems   Constitutional: Positive for appetite change. Negative for chills and fever.   HENT: Positive for sneezing and sore throat (\"due to cough\"). Negative for congestion and rhinorrhea.    Respiratory: Positive for cough, shortness of breath and wheezing (last night ).    Cardiovascular: Positive for chest pain (behind breast area with coughing).   Allergic/Immunologic: Negative for environmental allergies.       OBJECTIVE    Vitals:    03/05/19 1713   BP: 122/83   Patient Site: Right Arm   Patient Position: Sitting   Cuff Size: Adult Large   Pulse: 85   Resp: 24   Temp: 98.6  F (37  C)   TempSrc: Oral   SpO2: 95%   Weight: (!) 242 lb 1.6 oz (109.8 kg)       Physical Exam   Constitutional: She is oriented to person, place, and time. She appears well-developed and well-nourished. No distress.   HENT:   Right Ear: External ear normal.   Left Ear: External ear normal.   Mouth/Throat: Mucous membranes are normal. Posterior oropharyngeal erythema (mild ) present.   Eyes: Conjunctivae are normal. Right eye exhibits no discharge. " Left eye exhibits no discharge.   Cardiovascular: Normal rate, regular rhythm, normal heart sounds and intact distal pulses.   Pulmonary/Chest: Effort normal. She has wheezes (with forced expiration ).   Musculoskeletal: Normal range of motion.   Neurological: She is alert and oriented to person, place, and time.   Skin: Skin is warm and dry. Capillary refill takes less than 2 seconds.   Psychiatric: She has a normal mood and affect. Her behavior is normal. Judgment and thought content normal.       Labs:  Recent Results (from the past 240 hour(s))   Rapid Strep A Screen-Throat   Result Value Ref Range    Rapid Strep A Antigen No Group A Strep detected, presumptive negative No Group A Strep detected, presumptive negative         Radiology:        PATIENT INSTRUCTIONS:   Patient Instructions   You are having common cold with an asthma exacerbation.  Restart your Flovent until you are feeling much better.  You can use your Ventolin inhaler 2 puffs every 6 hours as needed for shortness of breath or wheezing.  Return if this does not control your symptoms.      Patient Education     Asthma (Adult)  Asthma is a disease where the medium and  small air passages within the lung go into spasm and restrict the flow of air. Inflammation and swelling of the airways cause further blockage. During an acute asthma attack, these factors cause trouble breathing, wheezing, cough and chest tightness.    An asthma attack can be triggered by many things. Common triggers include infections such as the common cold, bronchitis, and pneumonia. Irritants such as smoke or pollutants in the air, very cold air, emotional upset, and exercise can also trigger an attack. In many adults with asthma, allergies to dust, mold, pollen and animal dander can cause an asthma attack. Skipping doses of daily asthma medicine can also bring on an asthma attack.  Asthma can be controlled using the proper medicines prescribed by your healthcare provider and  "avoiding exposure to known triggers including allergens and irritants.  Home care    Take prescribed medicine exactly at the times advised. If you need medicine such as from a hand held inhaler or aerosol breathing machine more than every 4 hours, contact your healthcare provider or seek immediate medical attention. If prescribed an antibiotic or prednisone, take all of the medicine as prescribed, even if you are feeling better after a few days.    Don't smoke. Avoid being exposed to the smoke of others.    Some people with asthma have worsening of their symptoms when they take aspirin and non-steroidal or fever-reducing medicines like ibuprofen and naproxen. Talk to your healthcare provider if you think this may apply to you.  Follow-up care  Follow up with your healthcare provider, or as advised. Always bring all of your current medicines to any appointments with your healthcare provider. Also bring a complete list of medicines even those not taken for asthma. If you don't already have one, talk to your healthcare provider about developing your own \"Asthma Action Plan.\"  A pneumococcal (pneumonia) vaccine and yearly flu shot (every fall) are recommended. Ask your doctor about this.  When to seek medical advice  Call your healthcare provider right away if any of these occur:     Increased wheezing or shortness of breath    Need to use your inhalers more often than usual without relief    Fever of 100.4 F (38 C) or higher, or as directed by your healthcare provider    Coughing up lots of dark-colored or bloody sputum (mucus)    Chest pain with each breath    If you use a peak flow meter as part of an Asthma Action Plan, and you are still in the yellow zone (50% to 80%) 15 minutes after using inhaler medicine.  Call 911  Call 911 if any of the following occur    Trouble walking or talking because of shortness of breath    If you use a peak flow meter as part of an Asthma Action Plan and you are still in the red zone " (less than 50%) 15 minutes after using inhaler medicine    Lips or fingernails turning gray or blue  Date Last Reviewed: 5/1/2017 2000-2017 [employer] acquired by [employer]. [address] [place] [address], [place] [address]. All rights reserved. This information is not intended as a substitute for professional medical care. Always follow your healthcare professional's instructions.

## 2021-06-28 NOTE — PROGRESS NOTES
Progress Notes by Ye Santillan DO at 11/24/2019 10:40 AM     Author: Ye Santillan DO Service: -- Author Type: Physician    Filed: 11/24/2019 12:56 PM Encounter Date: 11/24/2019 Status: Signed    : Ye Santillan DO (Physician)       Chief Complaint   Patient presents with   ? Cough     ongoing coughing and cold sx's x 2 weeks that is worsening and moving into chest, congestion and draining         History of Present Illness: Rooming staff notes reviewed. She feels increased shortness of breath. No fevers or chills. She is using her albuterol inhaler more.  She does not think she needs a refill of this.     Review of systems: See history of present illness, all others negative.     Current Outpatient Medications   Medication Sig Dispense Refill   ? albuterol (PROAIR HFA;PROVENTIL HFA;VENTOLIN HFA) 90 mcg/actuation inhaler Inhale 2 puffs every 6 (six) hours as needed for wheezing. 18 g 0   ? ibuprofen (ADVIL,MOTRIN) 200 MG tablet Take 200 mg by mouth every 6 (six) hours as needed for pain. Pt states she uses PRN     ? oxiconazole (OXISTAT) 1 % Crea Apply to affected area on right foot twice daily until clear 30 each 2   ? albuterol (PROVENTIL) 2.5 mg /3 mL (0.083 %) nebulizer solution Take 3 mL (2.5 mg total) by nebulization every 4 (four) hours as needed for wheezing. 25 vial 2   ? azithromycin (ZITHROMAX) 250 MG tablet Take 500 mg (2 x 250 mg tablets) on day 1 followed by 250 mg (1 tablet) on days 2-5. 6 tablet 0   ? benzonatate (TESSALON) 200 MG capsule Take 1 capsule (200 mg total) by mouth 3 (three) times a day as needed. 20 capsule 0   ? fluticasone (FLOVENT HFA) 110 mcg/actuation inhaler Inhale 1 puff 2 (two) times a day. (Patient taking differently: Inhale 1 puff as needed.       ) 1 Inhaler 12   ? methylPREDNISolone (MEDROL DOSEPACK) 4 mg tablet follow package directions 21 tablet 0   ? ranitidine (ZANTAC) 150 MG tablet Take 1 tablet (150 mg total) by mouth 2 (two) times a day. (Patient taking  differently: Take 150 mg by mouth as needed.       ) 60 tablet 1   ? triamcinolone (KENALOG) 0.1 % cream Apply to affected area twice daily as needed. 30 g 0     No current facility-administered medications for this visit.      Past Medical History:   Diagnosis Date   ? Breast cancer (H)     11 years   ? Breast cyst    ? Hx antineoplastic chemotherapy    ? Hx of radiation therapy       Past Surgical History:   Procedure Laterality Date   ? BREAST BIOPSY     ? HYSTERECTOMY     ? MASTECTOMY Left    ? OOPHORECTOMY     ? [facility] MASTECTOMY, MODIFIED RADICAL      Description: Modified Radical Mastectomy Left Breast;  Proc Date: 06/28/2007;  Comments: with complete lymph node disection.   ? [facility] REMOVAL OF TONSILS,<12 Y/O      Description: Tonsillectomy;  Recorded: 06/13/2007;   ? [facility] VAGINAL HYSTERECTOMY,UTERUS 250 GMS/<      Description: Vaginal Hysterectomy;  Recorded: 10/29/2009;  Comments: LAVH with BSO.  Cervix removed.      Social History     Tobacco Use   ? Smoking status: Never Smoker   ? Smokeless tobacco: Never Used   Substance Use Topics   ? Alcohol use: Yes     Comment: social   ? Drug use: No        Family History   Problem Relation Age of Onset   ? Cancer Mother    ? Breast cancer Mother    ? Stroke Mother 77        2 strokes 8/15, stroke 10/15.  Not disabling,  memory loss   ? Clotting disorder Mother    ? Diabetes Mother    ? Hypertension Mother    ? Diabetes Father    ? Stroke Father    ? Diabetes Sister    ? Breast cancer Paternal Aunt    ? Diabetes Sister    ? Diabetes Sister    ? Heart attack Sister    ? Pulmonary embolism Sister    ? Breast cancer Paternal Aunt    ? Asthma Son    ? Asthma Daughter        Vitals:    11/24/19 1048 11/24/19 1050   BP: 151/86 155/90   Pulse: 63    Resp: 14    Temp: 97.9  F (36.6  C)    TempSrc: Oral    SpO2: 97%    Weight: (!) 235 lb (106.6 kg)        EXAM:   General: Vital signs reviewed.  Patient is in no acute appearing distress except for occasional coughing, especially with  deeper inspirations.  Breathing appears nonlabored.  Patient is alert and oriented ×3.  ENT: Ear exam shows bilateral tympanic membranes to be clear without injection, nasal turbinates show no injection or edema, no pharyngeal injection or exudate.  Neck: supple with no adenoapthy.  Heart: Heart rate is regular without murmur.  Lungs: Lungs are clear to auscultation with good airflow bilaterally.  Skin: Skin is warm and dry without any rash noted.    Assessment/Plan   1. Acute bronchitis with symptoms greater than 10 days  azithromycin (ZITHROMAX) 250 MG tablet    benzonatate (TESSALON) 200 MG capsule    methylPREDNISolone (MEDROL DOSEPACK) 4 mg tablet   2. Cough  benzonatate (TESSALON) 200 MG capsule    methylPREDNISolone (MEDROL DOSEPACK) 4 mg tablet       Patient Instructions   See hand out for treatment advice.     Patient Education     Bronchitis, Antibiotic Treatment (Adult)    Bronchitis is an infection of the air passages (bronchial tubes) in your lungs. It often occurs when you have a cold. This illness is contagious during the first few days and is spread through the air by coughing and sneezing, or by direct contact (touching the sick person and then touching your own eyes, nose, or mouth).  Symptoms of bronchitis include cough with mucus (phlegm) and low-grade fever. Bronchitis usually lasts 7 to 14 days. Mild cases can be treated with simple home remedies. More severe infection is treated with an antibiotic.  Home care  Follow these guidelines when caring for yourself at home:    If your symptoms are severe, rest at home for the first 2 to 3 days. When you go back to your usual activities, don't let yourself get too tired.    Do not smoke. Also avoid being exposed to secondhand smoke.    You may use over-the-counter medicines to control fever or pain, unless another medicine was prescribed. If you have chronic liver or kidney disease or have ever had a stomach ulcer or gastrointestinal bleeding, talk  with your healthcare provider before using these medicines. Also talk to your provider if you are taking medicine to prevent blood clots. Aspirin should never be given to anyone younger than 18 years of age who is ill with a viral infection or fever. It may cause severe liver or brain damage.    Your appetite may be poor, so a light diet is fine. Avoid dehydration by drinking 6 to 8 glasses of fluids per day (such as water, soft drinks, sports drinks, juices, tea, or soup). Extra fluids will help loosen secretions in the nose and lungs.    Over-the-counter cough, cold, and sore-throat medicines will not shorten the length of the illness, but they may be helpful to reduce symptoms. (Note: Do not use decongestants if you have high blood pressure.)    Finish all antibiotic medicine. Do this even if you are feeling better after only a few days.  Follow-up care  Follow up with your healthcare provider, or as advised. If you had an X-ray or ECG (electrocardiogram), a specialist will review it. You will be notified of any new findings that may affect your care.  If you are age 65 or older, or if you have a chronic lung disease or condition that affects your immune system, or you smoke, ask your healthcare provider about getting a pneumococcal vaccine and a yearly flu shot (influenza vaccine).  When to seek medical advice  Call your healthcare provider right away if any of these occur:    Fever of 100.4 F (38 C) or higher, or as directed by your healthcare provider    Coughing up increased amounts of colored sputum    Weakness, drowsiness, headache, facial pain, ear pain, or a stiff neck  Call 911  Call 911 if any of these occur.    Coughing up blood    Worsening weakness, drowsiness, headache, or stiff neck    Trouble breathing, wheezing, or pain with breathing  Date Last Reviewed: 9/13/2015 2000-2017 The "[employer]. [address] [place] [address], [place] [address]. All rights reserved. This information is not  intended as a substitute for professional medical care. Always follow your healthcare professional's instructions.              Ye Santillan, DO

## 2021-07-13 ENCOUNTER — RECORDS - HEALTHEAST (OUTPATIENT)
Dept: ADMINISTRATIVE | Facility: CLINIC | Age: 51
End: 2021-07-13

## 2021-09-02 ENCOUNTER — ONCOLOGY VISIT (OUTPATIENT)
Dept: ONCOLOGY | Facility: HOSPITAL | Age: 51
End: 2021-09-02
Attending: INTERNAL MEDICINE
Payer: COMMERCIAL

## 2021-09-02 VITALS
HEART RATE: 81 BPM | SYSTOLIC BLOOD PRESSURE: 142 MMHG | RESPIRATION RATE: 16 BRPM | BODY MASS INDEX: 41.07 KG/M2 | DIASTOLIC BLOOD PRESSURE: 96 MMHG | TEMPERATURE: 97.9 F | WEIGHT: 243 LBS

## 2021-09-02 DIAGNOSIS — Z17.0 MALIGNANT NEOPLASM OF UPPER-OUTER QUADRANT OF LEFT BREAST IN FEMALE, ESTROGEN RECEPTOR POSITIVE (H): Primary | ICD-10-CM

## 2021-09-02 DIAGNOSIS — C50.412 MALIGNANT NEOPLASM OF UPPER-OUTER QUADRANT OF LEFT BREAST IN FEMALE, ESTROGEN RECEPTOR POSITIVE (H): Primary | ICD-10-CM

## 2021-09-02 PROCEDURE — 99214 OFFICE O/P EST MOD 30 MIN: CPT | Performed by: INTERNAL MEDICINE

## 2021-09-02 PROCEDURE — G0463 HOSPITAL OUTPT CLINIC VISIT: HCPCS

## 2021-09-02 ASSESSMENT — PAIN SCALES - GENERAL: PAINLEVEL: NO PAIN (0)

## 2021-09-02 NOTE — PROGRESS NOTES
"Oncology Rooming Note    September 2, 2021 3:48 PM   Nichol Gee is a 50 year old female who presents for:    Chief Complaint   Patient presents with     Oncology Clinic Visit     Malignant neoplasm of upper-outer quadrant of left breast in female, estrogen receptor positive (H)     Initial Vitals: There were no vitals taken for this visit. Estimated body mass index is 40.56 kg/m  as calculated from the following:    Height as of 12/14/20: 1.638 m (5' 4.5\").    Weight as of 12/14/20: 108.9 kg (240 lb). There is no height or weight on file to calculate BSA.  Data Unavailable Comment: Data Unavailable   No LMP recorded.  Allergies reviewed: Yes  Medications reviewed: Yes    Medications: Medication refills not needed today.  Pharmacy name entered into [email or web address]: [id number] - [place], [place] - [id number] [facility] [address]    Clinical concerns:  Malignant neoplasm of upper-outer quadrant of left breast in female, estrogen receptor positive (H)      Myriam Reid CMA            "

## 2021-09-02 NOTE — PROGRESS NOTES
[facility] cancer Care Progress Note    Patient: Nichol Gee  MRN: [id number]   Date of Service: Sep 2, 2021         Reason for visit      1. Malignant neoplasm of upper-outer quadrant of left breast in female, estrogen receptor positive (H)        Assessment     1.         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   Subsequent to that, she has been on aromatase inhibitor therapy from 02/2008 to July 2015.  Currently on observation. Slight fibronudularity seen on her recent mammogram.  2.         Poor CYP2D6 metabolism.  3.         Normal bone density..  4.           Health maintenance issues.  She has some hypertension, elevated blood sugar levels, borderline elevated hemoglobin A1c as well as poor lipid panel.    Plan     1.         RTC a year for check up.  She will have yearly mammogram.  2.         Good diet and exercise .  I spent for bit of time discussing with her about maintaining her health.  She definitely has prediabetes in my opinion with a hemoglobin A1c of 6.1.  Her lipid panel also indicates similar findings that would be seen in diabetics.  She is obviously overweight and she is aware of it.  She also has slight hypertension.  All of these things if not properly controlled in time can cause disastrous health consequences few years down the road.  3.         Followup with me in a years' time .  4.         Mammogram on the right side in November.  5.         She should also follow-up with her primary care.      Clinical stage      Infiltrating Lobular Carcinoma Of The Breast Mixed With Other Types Of Carcinoma    Primary site: Breast (Left)    Clinical free text: ER+,CO+,[id number] positive by FISH    Clinical: Stage IIIA (T3, N1, cM0) signed by Jersey HOOVER  MD Richard on 7/13/2015 11:05 AM    Pathologic: Stage IIIA (T3, N1a, cM0) signed by Jersey Ramos MD on 7/13/2015 11:05 AM    Summary: Stage IIIA (T3, N1a, cM0)      History     Nichol Gee is a very pleasant 50 year old  female with a history of breast cancer diagnosed in June 2008, premenopausal at the time of diagnosis, located on the left side, presenting as a lump, measuring 5.2 cm in size, 1 positive lymph node, ER/UT positive, HER-2/gustavo 2+ 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  Subsequent to that, she was treated with Taxotere, carboplatin and Herceptin for 6 cycles and subsequent to that, she was put on hormone therapy with tamoxifen but was found to have very poor CYP2D6 metabolism so she was started on aromatase inhibitor after being on Lupron therapy.  Subsequent to that, she underwent a total abdominal hysterectomy in 2009 and was on aromatase inhibitors with anastrozole from 02/2008 to July 2015.  She stopped after that.    She is also known to have osteoporosis and she is unable to tolerate oral bisphosphonates.  She got once a year Zometa. Bone density in 2016 was normal. So Zometa was stopped in 2015.    Her mother however is not doing well.  Her dementia is getting worse.  She is in a memory care unit.  On the positive side her younger daughter has now gone to college.  She is happy about that.    Comes in today for scheduled follow-up.  She denies any new medical problems.  Continues to do well overall.       Past Medical History     Past Medical History:   Diagnosis Date     Breast cancer (H)     11 years     Breast cyst      Hx antineoplastic chemotherapy      Hx of radiation therapy        Review of Systems   Constitutional  Constitutional (WDL): All constitutional elements are within defined limits  Neurosensory  Neurosensory (WDL): All neurosensory elements are within defined limits  Eye   Eye Disorder (WDL): All eye disorder elements are within defined limits  Ear  Ear Disorder (WDL): All ear  disorder elements are within defined limits  Cardiovascular  Cardiovascular (WDL): All cardiovascular elements are within defined limits  Pulmonary  Respiratory (WDL): Within Defined Limits  Gastrointestinal  Gastrointestinal (WDL): All gastrointestinal elements are within defined limits  Genitourinary  Genitourinary (WDL): All genitourinary elements are within defined limits  Lymphatic  Lymph (WDL): All lymph disorder elements are within defined limits  Musculoskeletal and Connective Tissue  Musculoskeletal and Connetive Tissue Disorders (WDL): All Musculoskeletal and Connetive Tissue Disorder elements are within defined limits  Integumentary  Integumentary (WDL): All integumentary elements are within defined limits  Patient Coping  Patient Coping: Accepting;Open/discussion  Accompanied by  Accompanied by: Alone  Oral Chemo Adherence           ECOG performance status and Distress Assessment      ECOG Performance:    ECOG Performance Status: 0    Distress Assessment  Distress Assessment Score: No distress:     Pain Status  Currently in Pain: No/denies      Vital Signs     BP (!) 142/96 (BP Location: Left arm, Patient Position: Sitting, Cuff Size: Adult Regular)   Pulse 81   Temp 97.9  F (36.6  C) (Oral)   Resp 16   Wt 110.2 kg (243 lb)   BMI 41.07 kg/m       Physical Exam     GENERAL: No acute distress. Cooperative in conversation.   HEENT: Pupils are equal, round and reactive. Oral mucosa is clean and intact. No ulcerations or mucositis noted. No bleeding noted.  RESP:Chest symmetric lungs are clear bilaterally per auscultation. Regular respiratory rate. No wheezes or rhonchi.  CV: Normal S1 S2 Regular, rate and rhythm. No murmurs.  ABD: Nondistended, soft, nontender. Positive bowel sounds. No organomegaly.   EXTREMITIES: No lower extremity edema.   NEURO: Non- focal. Alert and oriented x3.  Cranial nerves appear intact.  PSYCH: Within normal limits. No depression or anxiety.  SKIN: Warm dry intact.  Radiation  skin changes on her left chest wall.      Lab Results     No results found for this or any previous visit (from the past 240 hour(s)).     Imaging Results     No results found.     Jersey Ramos MD

## 2021-09-02 NOTE — LETTER
"    9/2/2021         RE: Nichol Gee  [address]  [facility] [address]        Dear Colleague,    Thank you for referring your patient, Nichol Gee, to the [facility] [place]. Please see a copy of my visit note below.    Oncology Rooming Note    September 2, 2021 3:48 PM   Nichol Gee is a 50 year old female who presents for:    Chief Complaint   Patient presents with     Oncology Clinic Visit     Malignant neoplasm of upper-outer quadrant of left breast in female, estrogen receptor positive (H)     Initial Vitals: There were no vitals taken for this visit. Estimated body mass index is 40.56 kg/m  as calculated from the following:    Height as of 12/14/20: 1.638 m (5' 4.5\").    Weight as of 12/14/20: 108.9 kg (240 lb). There is no height or weight on file to calculate BSA.  Data Unavailable Comment: Data Unavailable   No LMP recorded.  Allergies reviewed: Yes  Medications reviewed: Yes    Medications: Medication refills not needed today.  Pharmacy name entered into [employer]: [id number] - [place], [place] - [address] [facility] [address]    Clinical concerns:  Malignant neoplasm of upper-outer quadrant of left breast in female, estrogen receptor positive (H)      Myriam Reid, [facility] cancer Care Progress Note    Patient: Nichol Gee  MRN: [id number]   Date of Service: Sep 2, 2021         Reason for visit      1. Malignant neoplasm of upper-outer quadrant of left breast in female, estrogen receptor positive (H)        Assessment     1.         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   Subsequent to that, she has been on aromatase inhibitor therapy from 02/2008 to July 2015.  Currently on " observation. Slight fibronudularity seen on her recent mammogram.  2.         Poor CYP2D6 metabolism.  3.         Normal bone density..  4.           Health maintenance issues.  She has some hypertension, elevated blood sugar levels, borderline elevated hemoglobin A1c as well as poor lipid panel.    Plan     1.         RTC a year for check up.  She will have yearly mammogram.  2.         Good diet and exercise .  I spent for bit of time discussing with her about maintaining her health.  She definitely has prediabetes in my opinion with a hemoglobin A1c of 6.1.  Her lipid panel also indicates similar findings that would be seen in diabetics.  She is obviously overweight and she is aware of it.  She also has slight hypertension.  All of these things if not properly controlled in time can cause disastrous health consequences few years down the road.  3.         Followup with me in a years' time .  4.         Mammogram on the right side in November.  5.         She should also follow-up with her primary care.      Clinical stage      Infiltrating Lobular Carcinoma Of The Breast Mixed With Other Types Of Carcinoma    Primary site: Breast (Left)    Clinical free text: ER+,PA+,Avy6vfr positive by FISH    Clinical: Stage IIIA (T3, N1, cM0) signed by Jersey Ramos MD on 7/13/2015 11:05 AM    Pathologic: Stage IIIA (T3, N1a, cM0) signed by Jersey Ramos MD on 7/13/2015 11:05 AM    Summary: Stage IIIA (T3, N1a, cM0)      History     Nichol Gee is a very pleasant 50 year old  female with a history of breast cancer diagnosed in June 2008, premenopausal at the time of diagnosis, located on the left side, presenting as a lump, measuring 5.2 cm in size, 1 positive lymph node, ER/PA positive, HER-2/gustavo 2+ 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  Subsequent to that, she was treated with Taxotere, carboplatin and Herceptin for 6 cycles and subsequent to that, she was put on hormone therapy with tamoxifen but was found to have very  poor CYP2D6 metabolism so she was started on aromatase inhibitor after being on Lupron therapy.  Subsequent to that, she underwent a total abdominal hysterectomy in 2009 and was on aromatase inhibitors with anastrozole from 02/2008 to July 2015.  She stopped after that.    She is also known to have osteoporosis and she is unable to tolerate oral bisphosphonates.  She got once a year Zometa. Bone density in 2016 was normal. So Zometa was stopped in 2015.    Her mother however is not doing well.  Her dementia is getting worse.  She is in a memory care unit.  On the positive side her younger daughter has now gone to college.  She is happy about that.    Comes in today for scheduled follow-up.  She denies any new medical problems.  Continues to do well overall.       Past Medical History     Past Medical History:   Diagnosis Date     Breast cancer (H)     11 years     Breast cyst      Hx antineoplastic chemotherapy      Hx of radiation therapy        Review of Systems   Constitutional  Constitutional (WDL): All constitutional elements are within defined limits  Neurosensory  Neurosensory (WDL): All neurosensory elements are within defined limits  Eye   Eye Disorder (WDL): All eye disorder elements are within defined limits  Ear  Ear Disorder (WDL): All ear disorder elements are within defined limits  Cardiovascular  Cardiovascular (WDL): All cardiovascular elements are within defined limits  Pulmonary  Respiratory (WDL): Within Defined Limits  Gastrointestinal  Gastrointestinal (WDL): All gastrointestinal elements are within defined limits  Genitourinary  Genitourinary (WDL): All genitourinary elements are within defined limits  Lymphatic  Lymph (WDL): All lymph disorder elements are within defined limits  Musculoskeletal and Connective Tissue  Musculoskeletal and Connetive Tissue Disorders (WDL): All Musculoskeletal and Connetive Tissue Disorder elements are within defined limits  Integumentary  Integumentary (WDL):  All integumentary elements are within defined limits  Patient Coping  Patient Coping: Accepting;Open/discussion  Accompanied by  Accompanied by: Alone  Oral Chemo Adherence           ECOG performance status and Distress Assessment      ECOG Performance:    ECOG Performance Status: 0    Distress Assessment  Distress Assessment Score: No distress:     Pain Status  Currently in Pain: No/denies      Vital Signs     BP (!) 142/96 (BP Location: Left arm, Patient Position: Sitting, Cuff Size: Adult Regular)   Pulse 81   Temp 97.9  F (36.6  C) (Oral)   Resp 16   Wt 110.2 kg (243 lb)   BMI 41.07 kg/m       Physical Exam     GENERAL: No acute distress. Cooperative in conversation.   HEENT: Pupils are equal, round and reactive. Oral mucosa is clean and intact. No ulcerations or mucositis noted. No bleeding noted.  RESP:Chest symmetric lungs are clear bilaterally per auscultation. Regular respiratory rate. No wheezes or rhonchi.  CV: Normal S1 S2 Regular, rate and rhythm. No murmurs.  ABD: Nondistended, soft, nontender. Positive bowel sounds. No organomegaly.   EXTREMITIES: No lower extremity edema.   NEURO: Non- focal. Alert and oriented x3.  Cranial nerves appear intact.  PSYCH: Within normal limits. No depression or anxiety.  SKIN: Warm dry intact.  Radiation skin changes on her left chest wall.      Lab Results     No results found for this or any previous visit (from the past 240 hour(s)).     Imaging Results     No results found.     Jersey Ramos MD           Again, thank you for allowing me to participate in the care of your patient.        Sincerely,        Jersey Ramos MD, MD

## 2021-10-14 ENCOUNTER — E-VISIT (OUTPATIENT)
Dept: FAMILY MEDICINE | Facility: CLINIC | Age: 51
End: 2021-10-14
Payer: COMMERCIAL

## 2021-10-14 DIAGNOSIS — Z20.822 SUSPECTED COVID-19 VIRUS INFECTION: Primary | ICD-10-CM

## 2021-10-14 PROCEDURE — 99421 OL DIG E/M SVC 5-10 MIN: CPT | Performed by: FAMILY MEDICINE

## 2021-10-14 NOTE — PATIENT INSTRUCTIONS
Dear Nichol Gee,    Your symptoms show that you may have coronavirus (COVID-19). This illness can cause fever, cough and trouble breathing. Many people get a mild case and get better on their own. Some people can get very sick.    Will I be tested for COVID-19?  We would like to test you for Covid-19 virus. I have placed orders for this test.     For all employees or close contacts (except [place] and [facility] - see below), go to your [employer] home page and scroll down to the section that says  You have an appointment that needs to be scheduled  and click the large green button that says  Schedule Now  and follow the steps to find the next available opening.     If you are unable to complete these steps or if you cannot find any available times, please call [phone] to schedule employee testing.     [place] employees or close contacts, please call [phone].   [place] ([facility]) employees or close contacts call [phone].    Return to work/school/ guidance:  Please let your workplace manager and staffing office know when your quarantine ends     We can t give you an exact date as it depends on the above. You can calculate this on your own or work with your manager/staffing office to calculate this. (For example if you were exposed on 10/4, you would have to quarantine for 14 full days. That would be through 10/18. You could return on 10/19.)      If you receive a positive COVID-19 test result, follow the guidance of the those who are giving you the results. Usually the return to work is 10 (or in some cases 20 days from symptom onset.) If you work at [employer] [facility], you must also be cleared by Employee Occupational Health and Safety to return to work.        If you receive a negative COVID-19 test result and did not have a high risk exposure to someone with a known positive COVID-19 test, you can return to work once you're free of fever for 24 hours without fever-reducing medication and  your symptoms are improving or resolved.      If you receive a negative COVID-19 test and If you had a high risk exposure to someone who has tested positive for COVID-19 then you can return to work 14 days after your last contact with the positive individual    Note: If you have ongoing exposure to the covid positive person, this quarantine period may be more than 14 days. (For example, if you are continued to be exposed to your child who tested positive and cannot isolate from them, then the quarantine of 7-14 days can't start until your child is no longer contagious. This is typically 10 days from onset of the child's symptoms. So the total duration may be 17-24 days in this case.)    Sign up for [employer].   We know it's scary to hear that you might have COVID-19. We want to track your symptoms to make sure you're okay over the next 2 weeks. Please look for an email from [employer]--this is a free, online program that we'll use to keep in touch. To sign up, follow the link in the email you will receive. Learn more at [email or web address]    How can I take care of myself?    Get lots of rest. Drink extra fluids (unless a doctor has told you not to)    Take Tylenol (acetaminophen) or ibuprofen for fever or pain. If you have liver or kidney problems, ask your family doctor if it's okay to take Tylenol o ibuprofen    If you have other health problems (like cancer, heart failure, an organ transplant or severe kidney disease): Call your specialty clinic if you don't feel better in the next 2 days.    Know when to call 911. Emergency warning signs include:  o Trouble breathing or shortness of breath  o Pain or pressure in the chest that doesn't go away  o Feeling confused like you haven't felt before, or not being able to wake up  o Bluish-colored lips or face    Where can I get more information?   [employer] [place] - About COVID-19:   [email or web address]    CDC - What to Do If You're Sick:    www.cdc.gov/coronavirus/2019-ncov/about/steps-when-sick.html

## 2021-10-15 ENCOUNTER — LAB (OUTPATIENT)
Dept: FAMILY MEDICINE | Facility: CLINIC | Age: 51
End: 2021-10-15
Attending: FAMILY MEDICINE
Payer: COMMERCIAL

## 2021-10-15 DIAGNOSIS — Z20.822 SUSPECTED COVID-19 VIRUS INFECTION: ICD-10-CM

## 2021-10-15 PROCEDURE — U0003 INFECTIOUS AGENT DETECTION BY NUCLEIC ACID (DNA OR RNA); SEVERE ACUTE RESPIRATORY SYNDROME CORONAVIRUS 2 (SARS-COV-2) (CORONAVIRUS DISEASE [COVID-19]), AMPLIFIED PROBE TECHNIQUE, MAKING USE OF HIGH THROUGHPUT TECHNOLOGIES AS DESCRIBED BY CMS-2020-01-R: HCPCS

## 2021-10-15 PROCEDURE — U0005 INFEC AGEN DETEC AMPLI PROBE: HCPCS

## 2021-10-16 LAB — SARS-COV-2 RNA RESP QL NAA+PROBE: NEGATIVE

## 2022-01-30 ENCOUNTER — HEALTH MAINTENANCE LETTER (OUTPATIENT)
Age: 52
End: 2022-01-30

## 2022-02-25 ENCOUNTER — OFFICE VISIT (OUTPATIENT)
Dept: FAMILY MEDICINE | Facility: CLINIC | Age: 52
End: 2022-02-25
Payer: COMMERCIAL

## 2022-02-25 VITALS
BODY MASS INDEX: 40.15 KG/M2 | HEART RATE: 73 BPM | DIASTOLIC BLOOD PRESSURE: 74 MMHG | HEIGHT: 65 IN | TEMPERATURE: 98.2 F | OXYGEN SATURATION: 99 % | WEIGHT: 241 LBS | SYSTOLIC BLOOD PRESSURE: 126 MMHG

## 2022-02-25 DIAGNOSIS — L71.9 ROSACEA: ICD-10-CM

## 2022-02-25 DIAGNOSIS — E66.01 MORBID OBESITY (H): ICD-10-CM

## 2022-02-25 DIAGNOSIS — M77.11 LATERAL EPICONDYLITIS OF RIGHT ELBOW: Primary | ICD-10-CM

## 2022-02-25 DIAGNOSIS — C50.412 MALIGNANT NEOPLASM OF UPPER-OUTER QUADRANT OF LEFT BREAST IN FEMALE, ESTROGEN RECEPTOR POSITIVE (H): ICD-10-CM

## 2022-02-25 DIAGNOSIS — M65.4 RADIAL STYLOID TENOSYNOVITIS OF RIGHT HAND: ICD-10-CM

## 2022-02-25 DIAGNOSIS — Z17.0 MALIGNANT NEOPLASM OF UPPER-OUTER QUADRANT OF LEFT BREAST IN FEMALE, ESTROGEN RECEPTOR POSITIVE (H): ICD-10-CM

## 2022-02-25 PROCEDURE — 99214 OFFICE O/P EST MOD 30 MIN: CPT | Performed by: PHYSICIAN ASSISTANT

## 2022-02-25 RX ORDER — METRONIDAZOLE 7.5 MG/G
LOTION TOPICAL
Qty: 59 ML | Refills: 1 | Status: SHIPPED | OUTPATIENT
Start: 2022-02-25 | End: 2022-12-02

## 2022-02-25 RX ORDER — TRAMADOL HYDROCHLORIDE 50 MG/1
50 TABLET ORAL EVERY 6 HOURS PRN
Qty: 6 TABLET | Refills: 0 | Status: SHIPPED | OUTPATIENT
Start: 2022-02-25 | End: 2022-02-28

## 2022-02-25 RX ORDER — PREDNISONE 20 MG/1
TABLET ORAL
Qty: 13 TABLET | Refills: 0 | Status: SHIPPED | OUTPATIENT
Start: 2022-02-25 | End: 2022-12-02

## 2022-02-25 ASSESSMENT — PAIN SCALES - GENERAL: PAINLEVEL: MILD PAIN (2)

## 2022-02-25 NOTE — PATIENT INSTRUCTIONS
"For the arm/hand pain:     1. Schedule an appointment with physical therapy     2. Take the course of prednisone. This is hopefully to help reduce inflammation. Take it early in the day as it can be \"activating\" and I don't want it to keep you up at night  -- Do not take ibuprofen while on the prednisone. Tylenol IS okay    3.  some voltaren (diclofenac) 1% gel and apply to the hand and elbow area 2-3x daily    4. Reserve the tramadol for pain that is not controlled and causing you to lose sleep at night. If you end up not taking it at all, even better but it is there just in case  "

## 2022-02-25 NOTE — PROGRESS NOTES
"  Assessment & Plan     (M77.11) Lateral epicondylitis of right elbow  (primary encounter diagnosis)  Comment: pain following extensor digitorum of right arm/hand. Suspect overuse given no specific trauma.   Plan: predniSONE (DELTASONE) 20 MG tablet, Physical         Therapy Referral, traMADol (ULTRAM) 50 MG         tablet          See AVS - continue wearing the brace at night to keep wrist in neutral position  PT referral   Prednisone to help with inflammation  Topical ibuprofen gel     (M65.4) Radial styloid tenosynovitis of right hand  Comment:   Plan: predniSONE (DELTASONE) 20 MG tablet, Physical         Therapy Referral, traMADol (ULTRAM) 50 MG         tablet            (E66.01) Morbid obesity (H)  Comment:   Plan: continue diet/exercise modification    (C50.412,  Z17.0) Malignant neoplasm of upper-outer quadrant of left breast in female, estrogen receptor positive (H)  Comment:   Plan: in remission x14+ years    (L71.9) Rosacea  Comment: suspect rash on bridge of nose rosacea as she also has some redness across cheeks  Plan: metroNIDAZOLE (METROLOTION) 0.75 % external         lotion                  34 minutes spent on the date of the encounter doing chart review, patient visit and documentation        BMI:   Estimated body mass index is 40.73 kg/m  as calculated from the following:    Height as of this encounter: 1.638 m (5' 4.5\").    Weight as of this encounter: 109.3 kg (241 lb).           Return in about 2 months (around 4/25/2022) for If not improving or worsening.    SANDIP Mckeon [facility] SILVIA Gandara is a 51 year old who presents for the following health issues     HPI     Answers for HPI/ROS submitted by the patient on 2/22/2022  What is the reason for your visit today?: Right Hand and arm pain  When did your symptoms begin?: More than a month  What are your symptoms?: Pain  How would you describe these symptoms?: Severe  Are your symptoms:: Worsening  Is there " "anything that makes you feel worse?: Using it      Started with pain and some mild swelling in her hand just below her 2nd and 3rd fingers  Works for [employer] and does typing all day long so figured it was from that  Pain then seemed to spread up her arm and now also impacting her elbow  Not getting any better and in fact is feeling worse  Notices that if she keeps her elbow in a bend position or rests it on anything it will get worse  Has started to wear a night splint which does help substantially with pain on waking  No numbness/tingling  No weakness but has noticed it is hard to  things due to pain      -She has noticed some redness on the top of her nose here and there.   Did have rosacea several years ago  After her chemo treatment for breast cancer the rosacea disappeared    Jan 7, 2022 - tested positive for COVID  Feeling better  Traveling at the end of March on a cruise      Review of Systems   Remainder of ROS obtained and found to be negative other than that which was documented above        Objective    /74   Pulse 73   Temp 98.2  F (36.8  C) (Tympanic)   Ht 1.638 m (5' 4.5\")   Wt 109.3 kg (241 lb)   SpO2 99%   BMI 40.73 kg/m    Body mass index is 40.73 kg/m .  Physical Exam   GENERAL: healthy, alert and no distress  NOSE: redness with a few raised red papules across bridge of nose as well as b/l cheeks  ARM, right:   No obvious swelling, redness or warmth  Normal ROM although pain with bending of elbow and reaching behind head  Very tender to touch over lateral epicondyle as well as down the bellow of the forearm muscle to the hand  Also some mild tenderness over distal radius/wrist side                  "

## 2022-03-07 ENCOUNTER — THERAPY VISIT (OUTPATIENT)
Dept: OCCUPATIONAL THERAPY | Facility: CLINIC | Age: 52
End: 2022-03-07
Attending: PHYSICIAN ASSISTANT
Payer: COMMERCIAL

## 2022-03-07 DIAGNOSIS — M65.4 RADIAL STYLOID TENOSYNOVITIS OF RIGHT HAND: ICD-10-CM

## 2022-03-07 DIAGNOSIS — M25.521 PAIN IN RIGHT ELBOW: Primary | ICD-10-CM

## 2022-03-07 DIAGNOSIS — M77.11 LATERAL EPICONDYLITIS OF RIGHT ELBOW: ICD-10-CM

## 2022-03-07 PROCEDURE — 97165 OT EVAL LOW COMPLEX 30 MIN: CPT | Mod: GO | Performed by: OCCUPATIONAL THERAPIST

## 2022-03-07 PROCEDURE — 97112 NEUROMUSCULAR REEDUCATION: CPT | Mod: GO | Performed by: OCCUPATIONAL THERAPIST

## 2022-03-07 PROCEDURE — 97110 THERAPEUTIC EXERCISES: CPT | Mod: GO | Performed by: OCCUPATIONAL THERAPIST

## 2022-03-07 NOTE — PROGRESS NOTES
"Hand Therapy Initial Evaluation    Current Date:  3/7/2022    Subjective:  Nichol Gee is a 51 year old right hand dominant female.    Diagnosis:   Right LEP and deQuervains  DOI:  2/25/2022 (therapy referral)    Patient reports symptoms of pain, stiffness/loss of motion, weakness/loss of strength and edema of the right elbow and wrist which occurred due to overuse typing over the past 6 months. Since onset symptoms are gradually getting worse.  Special tests:  none.  Previous treatment: wrist splint sleeping x 2-3 months, steroids burst and taper. General health as reported by patient is good.  Pertinent medical history includes:Asthma, Cancer, Overweight  Medical allergies:see EMR.  Surgical history: cancer: breast, other: hysterectomy, tonsils.  Medication history: see list in EMR.    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)  Currently working in normal job without restrictions  Job Tasks: Computer Work, Prolonged Sitting, Repetitive Tasks  Prior functional level:  no limitations  Barriers include:none  Mobility: No difficulty  Transportation: drives    Functional Outcome Measure:  Upper Extremity Functional Index  SCORE:   Column Totals: 56/80  (A lower score indicates greater disability.)    Objective:  Pain Level (Scale 0-10)   3/7/2022   At Rest 2   With Use 8-\"11\"     Pain Description  Date 3/7/2022   Location elbow and hand   Pain Quality Aching, Burning, Sharp and Throbbing   Frequency constant     Pain is worst  daytime   Exacerbated by  keyboarding, reaching, lifting, gripping, writing   Relieved by NSAIDs, rest and night splint   Progression Gradually worsening     Posture  Forward Neck Posture and Rounded Forward Shoulders    Sensation  WNL throughout all nerve distributions; per patient report    ROM  WNL's elbow, forearm and wrist, some pain with wrist extension  Wrist 3/7/2022 3/7/2022   AROM (PROM) Left Right "   UD with Th Flex 35 35-     Strength   (Measured in pounds)  Pain Report: - none  + mild    ++ moderate    +++ severe    3/7/2022 3/7/2022   Trials Left Right   1  2  3 65  66  78 53-  40-  38-   Average 70 43       3/7/2022 3/7/2022    Left Right   Elbow Ext 64 33+     Lat Pinch 3/7/2022 3/7/2022   Trials Left Right   1  2  3 18  17  16 13-  13-  11-   Average 17 12     3 Pt Pinch 3/7/2022 3/7/2022   Trials Left Right   1  2  3 12  13  12 9-  8-  9-   Average 12 9     Resisted Testing  Pain Report:  - none    + mild    ++ moderate    +++ severe    3/7/2022   Elbow Extension -   Elbow Flexion -   Supination  -   Pronation -   Wrist Ext with RD, Elbow Ext +   Wrist Ext with UD, Elbow Ext -   Wrist Flex with RD, Elbow Ext -   Wrist Flex with UD, Elbow Ext -   EDC with Elbow Ext +++   Long Finger Test ++   EBP -   EPL -   APL -     Special Tests   Pain Report:  - none    + mild    ++ moderate    +++ severe    3/7/2022   WHAT Test + slight   Finkelsteins -   Radial Nerve Tinel's (DRSN) -   Thumb CMC Adduction Stress Test -   Thumb CMC Extension Stress Test -     Palpation  Pain Report:  - none    + mild    ++ moderate    +++ severe    3/7/2022   Pec Major ++   Proximal Triceps -   Spiral Groove -   Distal Triceps -   Anconeus -   ECRB at LEP +   ECU at LEP -   EDC at LEP +++   Radial Head -   Extensor Wad + slight   PIN Site -   1st dc -   Radial Styloid ++   FCR volar wrist -   Thumb CMC joint -     Assessment:  Patient presents with symptoms consistent with diagnosis of right elbow and wrist pain, with conservative intervention.     Patient's limitations or Problem List includes:  Pain, Weakness, Decreased , Decreased pinch and Tightness in musculature of the right elbow and wrist which interferes with the patient's ability to perform Self Care Tasks (dressing), Work Tasks, Sleep Patterns, Recreational Activities and Household Chores as compared to previous level of function.    Rehab Potential:   Excellent - Return to full activity, no limitations    Patient will benefit from skilled Occupational Therapy to increase flexibility, overall strength,  strength and pinch strength and decrease pain to return to previous activity level and resume normal daily tasks and to reach their rehab potential.    Barriers to Learning:  No barrier    Communication Issues:  Patient appears to be able to clearly communicate and understand verbal and written communication and follow directions correctly.    Chart Review: Chart Review and Simple history review with patient    Identified Performance Deficits: dressing, home establishment and management, meal preparation and cleanup, sleep, work and leisure activities    Assessment of Occupational Performance:  5 or more Performance Deficits    Clinical Decision Making (Complexity): Low complexity    Treatment Explanation:  The following has been discussed with the patient:  RX ordered/plan of care  Anticipated outcomes  Possible risks and side effects    Plan:  Frequency:  1 X week, once daily  Duration:  for 6 weeks    Treatment Plan:    Modalities:    US   Therapeutic Exercise:    AROM, PROM, Tendon Gliding, Isotonics and Isometrics  Neuromuscular re-ed:   Nerve Gliding, Posture and Kinesiotaping  Manual Techniques:   Friction massage and Myofascial release  Self Care:    Self Care Tasks and Ergonomic Considerations    Discharge Plan:  Achieve all LTG.  Independent in home treatment program.  Reach maximal therapeutic benefit.    Home Program:   Warmth for stiffness to forearm extensors  Ice to lateral elbow after activity for pain  TFM to LEP  MFR to forearm extensors with tennis ball, avoid PIN site  PROM with stretch to forearm extensors and flexors  Partial proximal radial nerve gliding   Pec stretch in doorway  Trial Kinesiotaping to LEP and PIN site  Wear OTC wrist splint sleeping  Avoid activities that exacerbate pain in the elbow  Work breaks 30-60 seconds every  "30-60 minutes   Lift with elbows at sides and palms up  Avoid reaching for ten key and mouse  Postural awareness, avoid rounded shoulders and forward head  Relax at rest, avoid holding hand \"ready\" at mouse and ten key    Next Visit:  See in 1-2 weeks  Assess response to HEP and kinesiotaping   Consider US to LEP if continued tenderness  Add postural exercises with chin tucks and scapular retraction   Progress to eccentric wrist extension strengthening and 3 position  strengthening   "

## 2022-03-21 ENCOUNTER — THERAPY VISIT (OUTPATIENT)
Dept: OCCUPATIONAL THERAPY | Facility: CLINIC | Age: 52
End: 2022-03-21
Payer: COMMERCIAL

## 2022-03-21 DIAGNOSIS — M25.521 PAIN IN RIGHT ELBOW: ICD-10-CM

## 2022-03-21 DIAGNOSIS — M77.11 LATERAL EPICONDYLITIS OF RIGHT ELBOW: ICD-10-CM

## 2022-03-21 PROCEDURE — 97140 MANUAL THERAPY 1/> REGIONS: CPT | Mod: GO | Performed by: OCCUPATIONAL THERAPIST

## 2022-03-21 PROCEDURE — 97110 THERAPEUTIC EXERCISES: CPT | Mod: GO | Performed by: OCCUPATIONAL THERAPIST

## 2022-03-21 NOTE — PROGRESS NOTES
"SOAP note objective information for 3/21/2022:    Diagnosis:   Right LEP and deQuervains  DOI:  2/25/2022 (therapy referral)    Pain Level (Scale 0-10)   3/7/2022 3/21/2022   At Rest 2 3   With Use 8-\"11\" 6     Palpation  Pain Report:  - none    + mild    ++ moderate    +++ severe    3/7/2022 3/21/2022   Pec Major ++ ++   Proximal Triceps - -   Spiral Groove - -   Distal Triceps - -   Anconeus - -   ECRB at LEP + + slight   ECU at LEP - -   EDC at LEP +++ ++   Radial Head - -   Extensor Wad + slight + slight   PIN Site - -   1st dc - -   Radial Styloid ++ +   FCR volar wrist - -   Thumb CMC joint - -     Home Program:   Warmth for stiffness to forearm extensors  Ice to lateral elbow after activity for pain  TFM to LEP  MFR to forearm extensors with tennis ball, avoid PIN site  PROM with stretch to forearm extensors and flexors  Partial proximal radial nerve gliding   Pec stretch in doorway  Postural exercises with chin tuck and scapular retraction   Eccentric wrist extension strengthening   Kinesiotaping to LEP, PIN site and trial dorsal hand/EDC  Wear OTC wrist splint sleeping  Avoid activities that exacerbate pain in the elbow  Work breaks 30-60 seconds every 30-60 minutes   Lift with elbows at sides and palms up  Avoid reaching for ten key and mouse  Postural awareness, avoid rounded shoulders and forward head  Relax at rest, avoid holding hand \"ready\" at mouse and ten key    Next Visit:  See in 2 weeks after pt returns from vacation   Assess response to postural exercises and eccentric strengthening  Defer US to LEP as tenderness decreased   Progress to 3 position  strengthening     Please refer to the daily flowsheet for treatment today, total treatment time and time spent performing 1:1 timed codes.   "

## 2022-04-11 ENCOUNTER — THERAPY VISIT (OUTPATIENT)
Dept: OCCUPATIONAL THERAPY | Facility: CLINIC | Age: 52
End: 2022-04-11
Payer: COMMERCIAL

## 2022-04-11 DIAGNOSIS — M77.11 LATERAL EPICONDYLITIS OF RIGHT ELBOW: ICD-10-CM

## 2022-04-11 DIAGNOSIS — M25.521 PAIN IN RIGHT ELBOW: Primary | ICD-10-CM

## 2022-04-11 PROCEDURE — 97110 THERAPEUTIC EXERCISES: CPT | Mod: GO | Performed by: OCCUPATIONAL THERAPIST

## 2022-04-11 PROCEDURE — 97535 SELF CARE MNGMENT TRAINING: CPT | Mod: GO | Performed by: OCCUPATIONAL THERAPIST

## 2022-04-11 NOTE — PROGRESS NOTES
"Hand Therapy Progress/Discharge Note    Current Date:  4/11/2022    Reporting period is 3/7/2022 to 4/11/2022    Diagnosis:   Right LEP and deQuervains  DOI:  2/25/2022 (therapy referral)    Subjective:   Subjective changes noted by patient:  The elbow has been feeling pretty good, not much pain anymore even when I lift things.  Functional changes noted by patient:  Improvement in Household Chores  Patient has noted adverse reaction to:  None    Functional Outcome Measure:  Upper Extremity Functional Index  SCORE:   Column Totals: 76/80  (A lower score indicates greater disability.)    Objective:  Pain Level (Scale 0-10)   3/7/2022 4/11/2022   At Rest 2 0   With Use 8-\"11\" 2     Pain Description  Date 3/7/2022 4/11/2022   Location elbow and hand Lateral elbow   Pain Quality Aching, Burning, Sharp and Throbbing tender   Frequency constant   intermittent   Pain is worst  daytime daytime   Exacerbated by  keyboarding, reaching, lifting, gripping, writing overuse   Relieved by NSAIDs, rest and night splint Rest, taping, massage   Progression Gradually worsening Improving      Posture  Forward Neck Posture and Rounded Forward Shoulders    Sensation  WNL throughout all nerve distributions; per patient report    ROM  WNL's elbow, forearm and wrist  Wrist 3/7/2022 3/7/2022   AROM (PROM) Left Right   UD with Th Flex 35 35-     Strength   (Measured in pounds)  Pain Report: - none  + mild    ++ moderate    +++ severe    3/7/2022 3/7/2022 4/11/2022   Trials Left Right Right   1  2  3 65  66  78 53-  40-  38- 62-  69-  68-   Average 70 43 66       3/7/2022 3/7/2022 4/11/2022    Left Right Right   Elbow Ext 64 33+ 43+ slight     Lat Pinch 3/7/2022 3/7/2022 4/11/2022   Trials Left Right Right   1  2  3 18  17  16 13-  13-  11- 17-  16-  15-   Average 17 12 16     3 Pt Pinch 3/7/2022 3/7/2022 4/11/2022   Trials Left Right Right   1  2  3 12  13  12 9-  8-  9- 11-  12-  12-   Average 12 9 12     Resisted Testing  Pain " Report:  - none    + mild    ++ moderate    +++ severe    3/7/2022 4/11/2022   Elbow Extension - -   Elbow Flexion - -   Supination  - -   Pronation - -   Wrist Ext with RD, Elbow Ext + + slight   Wrist Ext with UD, Elbow Ext - -   Wrist Flex with RD, Elbow Ext - -   Wrist Flex with UD, Elbow Ext - -   EDC with Elbow Ext +++ + slight   Long Finger Test ++ -   EBP - -   EPL - -   APL - -     Palpation  Pain Report:  - none    + mild    ++ moderate    +++ severe    3/7/2022 4/11/2022   Pec Major ++ + slight   Proximal Triceps - -   Spiral Groove - -   Distal Triceps - -   Anconeus - -   ECRB at LEP + -   ECU at LEP - -   EDC at LEP +++ + slight   Radial Head - -   Extensor Wad + slight -   PIN Site - -   1st dc - -   Radial Styloid ++ -   FCR volar wrist - -   Thumb CMC joint - -     Please refer to the daily flowsheet for treatment provided today.     Assessment:  Response to therapy has been improvement to:  Flexibility:  less tightness in involved muscles  Strength:   and pinch  Pain:  intensity of pain is decreased and less tender over affected area    Overall Assessment:  Patient's symptoms are resolving and patient is independent in home exercise program  STG/LTG:  STGoals have been reviewed and progress or achievement has occurred;  see goal sheet for details and updates.  I have re-evaluated this patient and find that the nature, scope, duration and intensity of the therapy is appropriate for the medical condition of the patient.    Plan:  Frequency/Duration:  Discharge from Hand Therapy; continue home program.    Recommendations for Continued Therapy  Home Program:   Warmth for stiffness to forearm extensors  Ice to lateral elbow after activity for pain  TFM to LEP  MFR to forearm extensors with tennis ball, avoid PIN site  PROM with stretch to forearm extensors and flexors  Partial proximal radial nerve gliding   Pec stretch in doorway  Postural exercises with chin tuck and scapular retraction  "  Eccentric wrist extension strengthening   Kinesiotaping to LEP, PIN site and trial dorsal hand/EDC  Wear OTC wrist splint sleeping  Avoid activities that exacerbate pain in the elbow  Work breaks 30-60 seconds every 30-60 minutes   Lift with elbows at sides and palms up  Avoid reaching for ten key and mouse  Postural awareness, avoid rounded shoulders and forward head  Relax at rest, avoid holding hand \"ready\" at mouse and ten key  3 position  strengthening   "

## 2022-05-27 ENCOUNTER — ANCILLARY PROCEDURE (OUTPATIENT)
Dept: MAMMOGRAPHY | Facility: CLINIC | Age: 52
End: 2022-05-27
Attending: FAMILY MEDICINE
Payer: COMMERCIAL

## 2022-05-27 DIAGNOSIS — Z12.31 VISIT FOR SCREENING MAMMOGRAM: ICD-10-CM

## 2022-05-27 PROCEDURE — 77067 SCR MAMMO BI INCL CAD: CPT | Mod: 52

## 2022-06-22 ENCOUNTER — OFFICE VISIT (OUTPATIENT)
Dept: FAMILY MEDICINE | Facility: CLINIC | Age: 52
End: 2022-06-22
Payer: COMMERCIAL

## 2022-06-22 VITALS
OXYGEN SATURATION: 95 % | TEMPERATURE: 97.8 F | DIASTOLIC BLOOD PRESSURE: 87 MMHG | BODY MASS INDEX: 40.34 KG/M2 | WEIGHT: 238.7 LBS | RESPIRATION RATE: 20 BRPM | SYSTOLIC BLOOD PRESSURE: 154 MMHG | HEART RATE: 61 BPM

## 2022-06-22 DIAGNOSIS — R05.9 COUGH: Primary | ICD-10-CM

## 2022-06-22 DIAGNOSIS — F41.8 SITUATIONAL ANXIETY: ICD-10-CM

## 2022-06-22 PROCEDURE — 99213 OFFICE O/P EST LOW 20 MIN: CPT | Performed by: FAMILY MEDICINE

## 2022-06-22 RX ORDER — AZITHROMYCIN 250 MG/1
TABLET, FILM COATED ORAL
Qty: 6 TABLET | Refills: 0 | Status: SHIPPED | OUTPATIENT
Start: 2022-06-22 | End: 2022-06-27

## 2022-06-22 RX ORDER — LORAZEPAM 0.5 MG/1
0.5 TABLET ORAL EVERY 8 HOURS PRN
Qty: 15 TABLET | Refills: 0 | Status: SHIPPED | OUTPATIENT
Start: 2022-06-22 | End: 2022-12-02

## 2022-06-28 NOTE — PROGRESS NOTES
Assessment:       Cough  - azithromycin (ZITHROMAX) 250 MG tablet  Dispense: 6 tablet; Refill: 0    Situational anxiety  - LORazepam (ATIVAN) 0.5 MG tablet  Dispense: 15 tablet; Refill: 0         Plan:     Given that patient has had a 3-week history of cough that seems to be getting worse not better, did elect to give a course of azithromycin.  Follow-up if symptoms getting significantly worse or not improving.    Also with fairly significant situational anxiety.  Limited number of lorazepam given to patient.    MEDICATIONS:   Orders Placed This Encounter   Medications     LORazepam (ATIVAN) 0.5 MG tablet     Sig: Take 1 tablet (0.5 mg) by mouth every 8 hours as needed for anxiety     Dispense:  15 tablet     Refill:  0     azithromycin (ZITHROMAX) 250 MG tablet     Sig: Take 2 tablets (500 mg) by mouth daily for 1 day, THEN 1 tablet (250 mg) daily for 4 days.     Dispense:  6 tablet     Refill:  0         Subjective:       51 year old female presents for evaluation of a 3-week history of productive cough that seems to be getting worse and not improving.  She has not had a fever, chills.  She initially had sore throat and some nasal congestion which is since improved but the cough has only gotten worse.  She is not short of breath or wheezy.  She has tested negative for COVID-19.    She is also been under significant amount of stress at home recently with a lot of major personal events happening.  She is not sleeping well at all.  In the distant past she was given a prescription for lorazepam which was helpful.  Reviewed patient's prescription drug monitoring program profile and has not used these medications in the last year.  She is wondering if she could get a small prescription to take as needed.    Patient Active Problem List   Diagnosis     Abdominal pain, other specified site     Asymptomatic varicose veins     Family history of diabetes mellitus     H/O mastectomy, left     Hyperlipidemia     Malignant  neoplasm of upper-outer quadrant of left breast in female, estrogen receptor positive (H)     Obesity     Poisoning by drug     Rosacea     Tietze's disease     Wheezing     Morbid obesity (H)     Lateral epicondylitis of right elbow     Pain in right elbow       Past Medical History:   Diagnosis Date     Breast cancer (H)     11 years     Breast cyst      Hx antineoplastic chemotherapy      Hx of radiation therapy        Past Surgical History:   Procedure Laterality Date     BIOPSY BREAST       HC REMOVAL OF TONSILS,<12 Y/O      Description: Tonsillectomy;  Recorded: 06/13/2007;     HYSTERECTOMY       HYSTERECTOMY TOTAL ABDOMINAL  2009     MASTECTOMY Left      OOPHORECTOMY       OK MASTECTOMY, MODIFIED RADICAL      Description: Modified Radical Mastectomy Left Breast;  Proc Date: 06/28/2007;  Comments: with complete lymph node disection.     OK VAGINAL HYSTERECTOMY,UTERUS 250 GMS/<      Description: Vaginal Hysterectomy;  Recorded: 10/29/2009;  Comments: LAVH with BSO.  Cervix removed.       Current Outpatient Medications   Medication     azithromycin (ZITHROMAX) 250 MG tablet     LORazepam (ATIVAN) 0.5 MG tablet     metroNIDAZOLE (METROLOTION) 0.75 % external lotion     predniSONE (DELTASONE) 20 MG tablet     No current facility-administered medications for this visit.       Allergies   Allergen Reactions     Contrast Dye      Contrast Media Ready-Box MISC, 05/22/2009.       Hydrocodone-Acetaminophen Nausea       Family History   Problem Relation Age of Onset     Diabetes Mother      Diabetes Father      Diabetes Brother      Diabetes Sister      Cancer Mother      Breast Cancer Mother      Cerebrovascular Disease Mother 77.00        2 strokes 8/15, stroke 10/15.  Not disabling,  memory loss     Clotting Disorder Mother      Hypertension Mother      Cerebrovascular Disease Father      Diabetes Sister      Breast Cancer Paternal Aunt      Diabetes Sister      Diabetes Sister      Coronary Artery Disease Sister       Pulmonary Embolism Sister      Breast Cancer Paternal Aunt      Asthma Son      Asthma Daughter        Social History     Socioeconomic History     Marital status:      Spouse name: None     Number of children: None     Years of education: None     Highest education level: None   Tobacco Use     Smoking status: Never Smoker     Smokeless tobacco: Never Used         Review of Systems  Pertinent items are noted in HPI.      Objective:                       General Appearance:    BP (!) 154/87 (BP Location: Right arm, Patient Position: Sitting, Cuff Size: Adult Regular)   Pulse 61   Temp 97.8  F (36.6  C) (Oral)   Resp 20   Wt 108.3 kg (238 lb 11.2 oz)   LMP  (LMP Unknown)   SpO2 95%   BMI 40.34 kg/m          Alert, mildly ill-appearing but in no distress.   Head:    Normocephalic, without obvious abnormality, atraumatic   Eyes:    Conjunctiva/corneas clear   Ears:    Normal TM's without erythema or bulging. Normal external ear canals, both ears   Nose:   Nares normal, septum midline, mucosa normal, no drainage    or sinus tenderness   Throat:   Lips, mucosa, and tongue normal; teeth and gums normal.  No tonsilar hypertrophy or exudate.   Neck:   Supple, symmetrical, trachea midline, no adenopathy    Lungs:     Clear to auscultation bilaterally without wheezes, rales, or rhonchi, respirations unlabored    Heart:    Regular rate and rhythm, S1 and S2 normal, no murmur, rub or gallop       Extremities:   Extremities normal, atraumatic, no cyanosis or edema   Skin:   Skin color, texture, turgor normal, no rashes or lesions           This note has been dictated using voice recognition software. Any grammatical or context distortions are unintentional and inherent to the software

## 2022-08-22 ENCOUNTER — ONCOLOGY VISIT (OUTPATIENT)
Dept: ONCOLOGY | Facility: HOSPITAL | Age: 52
End: 2022-08-22
Attending: INTERNAL MEDICINE
Payer: COMMERCIAL

## 2022-08-22 VITALS
RESPIRATION RATE: 14 BRPM | WEIGHT: 242.2 LBS | TEMPERATURE: 98.6 F | OXYGEN SATURATION: 97 % | DIASTOLIC BLOOD PRESSURE: 86 MMHG | SYSTOLIC BLOOD PRESSURE: 138 MMHG | HEIGHT: 65 IN | HEART RATE: 70 BPM | BODY MASS INDEX: 40.35 KG/M2

## 2022-08-22 DIAGNOSIS — C50.412 MALIGNANT NEOPLASM OF UPPER-OUTER QUADRANT OF LEFT BREAST IN FEMALE, ESTROGEN RECEPTOR POSITIVE (H): Primary | ICD-10-CM

## 2022-08-22 DIAGNOSIS — Z17.0 MALIGNANT NEOPLASM OF UPPER-OUTER QUADRANT OF LEFT BREAST IN FEMALE, ESTROGEN RECEPTOR POSITIVE (H): Primary | ICD-10-CM

## 2022-08-22 PROCEDURE — 99214 OFFICE O/P EST MOD 30 MIN: CPT | Performed by: INTERNAL MEDICINE

## 2022-08-22 PROCEDURE — G0463 HOSPITAL OUTPT CLINIC VISIT: HCPCS

## 2022-08-22 ASSESSMENT — PAIN SCALES - GENERAL: PAINLEVEL: MILD PAIN (3)

## 2022-08-22 NOTE — LETTER
"    8/22/2022         RE: Nichol Gee  [address]  [place] [address]        Dear Colleague,    Thank you for referring your patient, Nichol Gee, to the [facility]. Please see a copy of my visit note below.    Oncology Rooming Note    August 22, 2022 2:24 PM   Nichol Gee is a 51 year old female who presents for:    Chief Complaint   Patient presents with     Oncology Clinic Visit     Return visit for 1 year follow up related to Malignant neoplasm of upper-outer quadrant of left breast in female, estrogen receptor positive     Initial Vitals: LMP  (LMP Unknown)  Estimated body mass index is 40.34 kg/m  as calculated from the following:    Height as of 2/25/22: 1.638 m (5' 4.5\").    Weight as of 6/22/22: 108.3 kg (238 lb 11.2 oz). There is no height or weight on file to calculate BSA.  Data Unavailable Comment: Data Unavailable   No LMP recorded (lmp unknown). Patient is postmenopausal.  Allergies reviewed: Yes  Medications reviewed: Yes    Medications: Medication refills not needed today.  Pharmacy name entered into [employer]: [id number] - [place], [place] - [address]    Clinical concerns: Return visit for 1 year follow up related to Malignant neoplasm of upper-outer quadrant of left breast in female, estrogen receptor positive        ABHI MARK CMA                [facility] cancer Care Progress Note    Patient: Nichol Gee  MRN: [id number]   Date of Service: Aug 22, 2022         Reason for visit      1. Malignant neoplasm of upper-outer quadrant of left breast in female, estrogen receptor positive (H)        Assessment     1.         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   " Subsequent to that, she has been on aromatase inhibitor therapy from 02/2008 to July 2015.  Currently on observation. Slight fibronudularity seen on her mammogram.  2.         Poor CYP2D6 metabolism.  3.         Normal bone density..  4.         Health maintenance issues.  She has some hypertension, elevated blood sugar levels, borderline elevated hemoglobin A1c as well as poor lipid panel.    Plan     1.  Continue active surveillance.  Yearly mammogram and checkup.  2.  Advised to follow-up with her regular doctor regarding her health care checkup.  Needs to have hemoglobin A1c rechecked again.  3.  Should get a colonoscopy.  4.  Good diet and proper exercise.  5.  Annual mammograms.  At some point I think Nichol may also need bone density checked.      Clinical stage      Infiltrating Lobular Carcinoma Of The Breast Mixed With Other Types Of Carcinoma    Primary site: Breast (Left)    Clinical free text: ER+,IL+,Umm0gyd positive by FISH    Clinical: Stage IIIA (T3, N1, cM0) signed by Jersey Ramos MD on 7/13/2015 11:05 AM    Pathologic: Stage IIIA (T3, N1a, cM0) signed by Jersey Ramos MD on 7/13/2015 11:05 AM    Summary: Stage IIIA (T3, N1a, cM0)      History     Nichol Gee is a very pleasant 51 year old  female with a history of breast cancer diagnosed in June 2008, premenopausal at the time of diagnosis, located on the left side, presenting as a lump, measuring 5.2 cm in size, 1 positive lymph node, ER/IL positive, HER-2/gustavo 2+ 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  Subsequent to that, she was treated with Taxotere, carboplatin and Herceptin for 6 cycles and subsequent to that, she was put on hormone therapy with tamoxifen but was found to have very poor CYP2D6 metabolism so she was started on aromatase inhibitor after being on Lupron therapy.  Subsequent to that, she underwent a total abdominal hysterectomy in 2009 and was on aromatase inhibitors with anastrozole from 02/2008 to July 2015.  She stopped  "after that.    She is also known to have osteoporosis and she is unable to tolerate oral bisphosphonates.  She got once a year Zometa. Bone density in 2016 was normal. So Zometa was stopped in 2015.    On the positive side her younger daughter has now gone to college.  She is happy about that.    Her mother passed away in May 2022 from dementia associated complications.    Nichol comes in today for scheduled follow-up.  Overall she is doing well.  She did have COVID in January 2022.  Did okay otherwise.  Occasionally feels some sensation in her right breast which is slightly painful at times.  No new changes in the medication.      Past Medical History     Past Medical History:   Diagnosis Date     Breast cancer (H)     11 years     Breast cyst      Hx antineoplastic chemotherapy      Hx of radiation therapy        Review of Systems   Review of system noted in the history.  ECOG performance status and Distress Assessment      ECOG Performance:    ECOG Performance Status: 0    Distress Assessment  Distress Assessment Score: No distress:     Pain Status  Currently in Pain: No/denies      Vital Signs     /86 (BP Location: Right arm, Patient Position: Sitting, Cuff Size: Adult Large)   Pulse 70   Temp 98.6  F (37  C) (Oral)   Resp 14   Ht 1.645 m (5' 4.75\")   Wt 109.9 kg (242 lb 3.2 oz)   LMP  (LMP Unknown)   SpO2 97%   BMI 40.62 kg/m       Physical Exam     GENERAL: No acute distress. Cooperative in conversation.   HEENT: Pupils are equal, round and reactive. Oral mucosa is clean and intact. No ulcerations or mucositis noted. No bleeding noted.  RESP:Chest symmetric lungs are clear bilaterally per auscultation. Regular respiratory rate. No wheezes or rhonchi.  CV: Normal S1 S2 Regular, rate and rhythm. No murmurs.  ABD: Nondistended, soft, nontender. Positive bowel sounds. No organomegaly.   EXTREMITIES: Trace bilateral lower extremity edema.   NEURO: Non- focal. Alert and oriented x3.  Cranial nerves " appear intact.  PSYCH: Within normal limits. No depression or anxiety.  SKIN: Warm dry intact.  Radiation skin changes on her left chest wall.  BREAST: Bilateral breast examination done.  Status postmastectomy on the left side with radiation skin changes.  The right breast has some lumpiness in it.  Cystlike feeling in 3 o'clock position.  No nipple discharge.  Bilateral axillary examination is negative.      Lab Results     No results found for this or any previous visit (from the past 240 hour(s)).     Imaging Results     No results found.     Jersey Ramos MD           Again, thank you for allowing me to participate in the care of your patient.        Sincerely,        Jersey Ramos MD, MD

## 2022-08-22 NOTE — PROGRESS NOTES
"Oncology Rooming Note    August 22, 2022 2:24 PM   Nichol Gee is a 51 year old female who presents for:    Chief Complaint   Patient presents with     Oncology Clinic Visit     Return visit for 1 year follow up related to Malignant neoplasm of upper-outer quadrant of left breast in female, estrogen receptor positive     Initial Vitals: LMP  (LMP Unknown)  Estimated body mass index is 40.34 kg/m  as calculated from the following:    Height as of 2/25/22: 1.638 m (5' 4.5\").    Weight as of 6/22/22: 108.3 kg (238 lb 11.2 oz). There is no height or weight on file to calculate BSA.  Data Unavailable Comment: Data Unavailable   No LMP recorded (lmp unknown). Patient is postmenopausal.  Allergies reviewed: Yes  Medications reviewed: Yes    Medications: Medication refills not needed today.  Pharmacy name entered into [employer]: [id number] - [place], [place] - [address]    Clinical concerns: Return visit for 1 year follow up related to Malignant neoplasm of upper-outer quadrant of left breast in female, estrogen receptor positive        ABHI MARK CMA            "

## 2022-08-22 NOTE — PROGRESS NOTES
[facility] cancer Care Progress Note    Patient: Nichol Gee  MRN: [id number]   Date of Service: Aug 22, 2022         Reason for visit      1. Malignant neoplasm of upper-outer quadrant of left breast in female, estrogen receptor positive (H)        Assessment     1.         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   Subsequent to that, she has been on aromatase inhibitor therapy from 02/2008 to July 2015.  Currently on observation. Slight fibronudularity seen on her mammogram.  2.         Poor CYP2D6 metabolism.  3.         Normal bone density..  4.         Health maintenance issues.  She has some hypertension, elevated blood sugar levels, borderline elevated hemoglobin A1c as well as poor lipid panel.    Plan     1.  Continue active surveillance.  Yearly mammogram and checkup.  2.  Advised to follow-up with her regular doctor regarding her health care checkup.  Needs to have hemoglobin A1c rechecked again.  3.  Should get a colonoscopy.  4.  Good diet and proper exercise.  5.  Annual mammograms.  At some point I think Nichol may also need bone density checked.      Clinical stage      Infiltrating Lobular Carcinoma Of The Breast Mixed With Other Types Of Carcinoma    Primary site: Breast (Left)    Clinical free text: ER+,OH+,Lew8eyx positive by FISH    Clinical: Stage IIIA (T3, N1, cM0) signed by Jersey Ramos MD on 7/13/2015 11:05 AM    Pathologic: Stage IIIA (T3, N1a, cM0) signed by Jersey Ramos MD on 7/13/2015 11:05 AM    Summary: Stage IIIA (T3, N1a, cM0)      History     Nichol Gee is a very pleasant 51 year old  female with a history of breast cancer diagnosed in June 2008, premenopausal at the time of diagnosis, located on the left side, presenting  "as a lump, measuring 5.2 cm in size, 1 positive lymph node, ER/DC positive, HER-2/gustavo 2+ 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  Subsequent to that, she was treated with Taxotere, carboplatin and Herceptin for 6 cycles and subsequent to that, she was put on hormone therapy with tamoxifen but was found to have very poor CYP2D6 metabolism so she was started on aromatase inhibitor after being on Lupron therapy.  Subsequent to that, she underwent a total abdominal hysterectomy in 2009 and was on aromatase inhibitors with anastrozole from 02/2008 to July 2015.  She stopped after that.    She is also known to have osteoporosis and she is unable to tolerate oral bisphosphonates.  She got once a year Zometa. Bone density in 2016 was normal. So Zometa was stopped in 2015.    On the positive side her younger daughter has now gone to college.  She is happy about that.    Her mother passed away in May 2022 from dementia associated complications.    Nichol comes in today for scheduled follow-up.  Overall she is doing well.  She did have COVID in January 2022.  Did okay otherwise.  Occasionally feels some sensation in her right breast which is slightly painful at times.  No new changes in the medication.      Past Medical History     Past Medical History:   Diagnosis Date     Breast cancer (H)     11 years     Breast cyst      Hx antineoplastic chemotherapy      Hx of radiation therapy        Review of Systems   Review of system noted in the history.  ECOG performance status and Distress Assessment      ECOG Performance:    ECOG Performance Status: 0    Distress Assessment  Distress Assessment Score: No distress:     Pain Status  Currently in Pain: No/denies      Vital Signs     /86 (BP Location: Right arm, Patient Position: Sitting, Cuff Size: Adult Large)   Pulse 70   Temp 98.6  F (37  C) (Oral)   Resp 14   Ht 1.645 m (5' 4.75\")   Wt 109.9 kg (242 lb 3.2 oz)   LMP  (LMP Unknown)   SpO2 97%   BMI 40.62 kg/m       Physical " Exam     GENERAL: No acute distress. Cooperative in conversation.   HEENT: Pupils are equal, round and reactive. Oral mucosa is clean and intact. No ulcerations or mucositis noted. No bleeding noted.  RESP:Chest symmetric lungs are clear bilaterally per auscultation. Regular respiratory rate. No wheezes or rhonchi.  CV: Normal S1 S2 Regular, rate and rhythm. No murmurs.  ABD: Nondistended, soft, nontender. Positive bowel sounds. No organomegaly.   EXTREMITIES: Trace bilateral lower extremity edema.   NEURO: Non- focal. Alert and oriented x3.  Cranial nerves appear intact.  PSYCH: Within normal limits. No depression or anxiety.  SKIN: Warm dry intact.  Radiation skin changes on her left chest wall.  BREAST: Bilateral breast examination done.  Status postmastectomy on the left side with radiation skin changes.  The right breast has some lumpiness in it.  Cystlike feeling in 3 o'clock position.  No nipple discharge.  Bilateral axillary examination is negative.      Lab Results     No results found for this or any previous visit (from the past 240 hour(s)).     Imaging Results     No results found.     Jersey Ramos MD

## 2022-09-24 ENCOUNTER — HEALTH MAINTENANCE LETTER (OUTPATIENT)
Age: 52
End: 2022-09-24

## 2022-11-30 PROBLEM — R10.9 ABDOMINAL PAIN, OTHER SPECIFIED SITE: Status: RESOLVED | Noted: 2020-12-14 | Resolved: 2022-11-30

## 2022-11-30 PROBLEM — L71.9 ROSACEA: Status: RESOLVED | Noted: 2020-12-14 | Resolved: 2022-11-30

## 2022-11-30 PROBLEM — R10.9 ABDOMINAL PAIN, OTHER SPECIFIED SITE: Status: ACTIVE | Noted: 2022-11-30

## 2022-12-02 ENCOUNTER — OFFICE VISIT (OUTPATIENT)
Dept: FAMILY MEDICINE | Facility: CLINIC | Age: 52
End: 2022-12-02
Payer: COMMERCIAL

## 2022-12-02 VITALS
OXYGEN SATURATION: 98 % | RESPIRATION RATE: 16 BRPM | SYSTOLIC BLOOD PRESSURE: 154 MMHG | WEIGHT: 241 LBS | HEIGHT: 65 IN | DIASTOLIC BLOOD PRESSURE: 88 MMHG | HEART RATE: 80 BPM | BODY MASS INDEX: 40.15 KG/M2 | TEMPERATURE: 98.1 F

## 2022-12-02 DIAGNOSIS — L30.9 DERMATITIS: ICD-10-CM

## 2022-12-02 DIAGNOSIS — M79.661 RIGHT CALF PAIN: ICD-10-CM

## 2022-12-02 DIAGNOSIS — Z00.00 HEALTHCARE MAINTENANCE: Primary | ICD-10-CM

## 2022-12-02 DIAGNOSIS — E55.9 HYPOVITAMINOSIS D: ICD-10-CM

## 2022-12-02 DIAGNOSIS — Z17.0 MALIGNANT NEOPLASM OF UPPER-OUTER QUADRANT OF LEFT BREAST IN FEMALE, ESTROGEN RECEPTOR POSITIVE (H): ICD-10-CM

## 2022-12-02 DIAGNOSIS — Z12.11 SCREEN FOR COLON CANCER: ICD-10-CM

## 2022-12-02 DIAGNOSIS — Z11.59 NEED FOR HEPATITIS C SCREENING TEST: ICD-10-CM

## 2022-12-02 DIAGNOSIS — I10 BENIGN ESSENTIAL HYPERTENSION: ICD-10-CM

## 2022-12-02 DIAGNOSIS — C50.412 MALIGNANT NEOPLASM OF UPPER-OUTER QUADRANT OF LEFT BREAST IN FEMALE, ESTROGEN RECEPTOR POSITIVE (H): ICD-10-CM

## 2022-12-02 DIAGNOSIS — Z11.4 SCREENING FOR HIV (HUMAN IMMUNODEFICIENCY VIRUS): ICD-10-CM

## 2022-12-02 DIAGNOSIS — L71.9 ROSACEA: ICD-10-CM

## 2022-12-02 DIAGNOSIS — E66.01 MORBID OBESITY (H): ICD-10-CM

## 2022-12-02 DIAGNOSIS — R05.1 ACUTE COUGH: ICD-10-CM

## 2022-12-02 DIAGNOSIS — Z78.0 POST-MENOPAUSAL: ICD-10-CM

## 2022-12-02 LAB
ANION GAP SERPL CALCULATED.3IONS-SCNC: 11 MMOL/L (ref 7–15)
BUN SERPL-MCNC: 11.7 MG/DL (ref 6–20)
CALCIUM SERPL-MCNC: 9.7 MG/DL (ref 8.6–10)
CHLORIDE SERPL-SCNC: 103 MMOL/L (ref 98–107)
CHOLEST SERPL-MCNC: 199 MG/DL
CREAT SERPL-MCNC: 0.76 MG/DL (ref 0.51–0.95)
DEPRECATED HCO3 PLAS-SCNC: 27 MMOL/L (ref 22–29)
ERYTHROCYTE [DISTWIDTH] IN BLOOD BY AUTOMATED COUNT: 15.5 % (ref 10–15)
GFR SERPL CREATININE-BSD FRML MDRD: >90 ML/MIN/1.73M2
GLUCOSE SERPL-MCNC: 107 MG/DL (ref 70–99)
HBA1C MFR BLD: 6.2 % (ref 0–5.6)
HCT VFR BLD AUTO: 41.2 % (ref 35–47)
HDLC SERPL-MCNC: 31 MG/DL
HGB BLD-MCNC: 13 G/DL (ref 11.7–15.7)
LDLC SERPL CALC-MCNC: 122 MG/DL
MCH RBC QN AUTO: 25.3 PG (ref 26.5–33)
MCHC RBC AUTO-ENTMCNC: 31.6 G/DL (ref 31.5–36.5)
MCV RBC AUTO: 80 FL (ref 78–100)
NONHDLC SERPL-MCNC: 168 MG/DL
PLATELET # BLD AUTO: 328 10E3/UL (ref 150–450)
POTASSIUM SERPL-SCNC: 4.7 MMOL/L (ref 3.4–5.3)
RBC # BLD AUTO: 5.13 10E6/UL (ref 3.8–5.2)
SODIUM SERPL-SCNC: 141 MMOL/L (ref 136–145)
TRIGL SERPL-MCNC: 228 MG/DL
TSH SERPL DL<=0.005 MIU/L-ACNC: 3.09 UIU/ML (ref 0.3–4.2)
WBC # BLD AUTO: 10 10E3/UL (ref 4–11)

## 2022-12-02 PROCEDURE — 82306 VITAMIN D 25 HYDROXY: CPT | Performed by: FAMILY MEDICINE

## 2022-12-02 PROCEDURE — 83036 HEMOGLOBIN GLYCOSYLATED A1C: CPT | Performed by: FAMILY MEDICINE

## 2022-12-02 PROCEDURE — 86803 HEPATITIS C AB TEST: CPT | Performed by: FAMILY MEDICINE

## 2022-12-02 PROCEDURE — 85027 COMPLETE CBC AUTOMATED: CPT | Performed by: FAMILY MEDICINE

## 2022-12-02 PROCEDURE — 99396 PREV VISIT EST AGE 40-64: CPT | Performed by: FAMILY MEDICINE

## 2022-12-02 PROCEDURE — 84443 ASSAY THYROID STIM HORMONE: CPT | Performed by: FAMILY MEDICINE

## 2022-12-02 PROCEDURE — 36415 COLL VENOUS BLD VENIPUNCTURE: CPT | Performed by: FAMILY MEDICINE

## 2022-12-02 PROCEDURE — 87389 HIV-1 AG W/HIV-1&-2 AB AG IA: CPT | Performed by: FAMILY MEDICINE

## 2022-12-02 PROCEDURE — 80048 BASIC METABOLIC PNL TOTAL CA: CPT | Performed by: FAMILY MEDICINE

## 2022-12-02 PROCEDURE — 80061 LIPID PANEL: CPT | Performed by: FAMILY MEDICINE

## 2022-12-02 PROCEDURE — 99214 OFFICE O/P EST MOD 30 MIN: CPT | Mod: 25 | Performed by: FAMILY MEDICINE

## 2022-12-02 RX ORDER — ALBUTEROL SULFATE 90 UG/1
2 AEROSOL, METERED RESPIRATORY (INHALATION) EVERY 6 HOURS PRN
Qty: 18 G | Refills: 1 | Status: SHIPPED | OUTPATIENT
Start: 2022-12-02 | End: 2022-12-05

## 2022-12-02 RX ORDER — METRONIDAZOLE 7.5 MG/G
LOTION TOPICAL
Qty: 59 ML | Refills: 1 | Status: SHIPPED | OUTPATIENT
Start: 2022-12-02 | End: 2024-09-06

## 2022-12-02 RX ORDER — HYDROCHLOROTHIAZIDE 12.5 MG/1
12.5 TABLET ORAL DAILY
Qty: 90 TABLET | Refills: 3 | Status: SHIPPED | OUTPATIENT
Start: 2022-12-02 | End: 2023-10-05

## 2022-12-02 ASSESSMENT — ENCOUNTER SYMPTOMS
HEMATURIA: 0
ARTHRALGIAS: 0
EYE PAIN: 0
JOINT SWELLING: 0
CHILLS: 0
PARESTHESIAS: 0
DYSURIA: 0
HEARTBURN: 0
HEMATOCHEZIA: 0
ABDOMINAL PAIN: 0
FREQUENCY: 0
WEAKNESS: 0
MYALGIAS: 1
NAUSEA: 0
HEADACHES: 0
BREAST MASS: 0
FEVER: 0
SHORTNESS OF BREATH: 0
CONSTIPATION: 0
DIZZINESS: 0
PALPITATIONS: 0
DIARRHEA: 0
NERVOUS/ANXIOUS: 0
SORE THROAT: 0
COUGH: 0

## 2022-12-02 NOTE — PATIENT INSTRUCTIONS
BP med - Hydrochlorothiazide    Lab and BP check week of Dec 19th  Think about getting a home cuff

## 2022-12-02 NOTE — PROGRESS NOTES
SUBJECTIVE:   CC: Nichol is an 52 year old who presents for preventive health visit.     Patient has been advised of split billing requirements and indicates understanding: Yes     HPI    Patient has a myriad of different concerns today.    She has been having right-sided calf pain.  This is fairly minor but is consistent throughout the day.  She states that she thought that this might be due to a new bed however she is thinking that it is not.  There is a strong family history of blood clots and she is concerned about this.  She notes that her leg may be has been slightly swollen but it has been fairly mild per her report.    History of elevated blood pressure readings but no diagnosis of hypertension.  Blood pressure is again elevated today.  She worries about this with a family history of stroke.    She otherwise has a mole on her neck that is a little darker than it used to be.    She is hopeful to get a refill of a cream that she has for dermatitis on her foot.  She feels as though it is metronidazole but is unsure.    She is hopeful to get a refill of her albuterol inhaler for intermittent coughing.    She also wants to discuss her mother's passing this past May.  She states that she has not felt as sad about this as she thought she should.  Her mother has been declining for about 5 years after stroke and was in memory care for the last 3 years.    Today's PHQ-2 Score:   PHQ-2 ( 1999 [employer]) 12/2/2022   Q1: Little interest or pleasure in doing things 0   Q2: Feeling down, depressed or hopeless 0   PHQ-2 Score 0   PHQ-2 Total Score (12-17 Years)- Positive if 3 or more points; Administer PHQ-A if positive -   Q1: Little interest or pleasure in doing things Not at all   Q2: Feeling down, depressed or hopeless Not at all   PHQ-2 Score 0       Have you ever done Advance Care Planning? (For example, a Health Directive, POLST, or a discussion with a medical provider or your loved ones about your wishes): No,  advance care planning information given to patient to review.  Patient plans to discuss their wishes with loved ones or provider.      Social History     Tobacco Use     Smoking status: Never     Smokeless tobacco: Never   Substance Use Topics     Alcohol use: Not on file     If you drink alcohol do you typically have >3 drinks per day or >7 drinks per week? No    Alcohol Use 12/2/2022   Prescreen: >3 drinks/day or >7 drinks/week? No   Prescreen: >3 drinks/day or >7 drinks/week? -       Reviewed orders with patient.  Reviewed health maintenance and updated orders accordingly - Yes  Lab work is in process    Breast Cancer Screening:    FHS-7:   Breast CA Risk Assessment (FHS-7) 5/27/2022 12/2/2022   Did any of your first-degree relatives have breast or ovarian cancer? Yes Yes   Did any of your relatives have bilateral breast cancer? No No   Did any man in your family have breast cancer? No No   Did any woman in your family have breast and ovarian cancer? No Yes   Did any woman in your family have breast cancer before age 50 y? No Unknown   Do you have 2 or more relatives with breast and/or ovarian cancer? Yes Yes   Do you have 2 or more relatives with breast and/or bowel cancer? No Unknown       Mammogram Screening: Recommended annual mammography  Pertinent mammograms are reviewed under the imaging tab.    History of abnormal Pap smear: Status post benign hysterectomy. Health Maintenance and Surgical History updated.     Reviewed and updated as needed this visit by clinical staff    Allergies  Meds              Reviewed and updated as needed this visit by Provider                 Past Medical History:   Diagnosis Date     Breast cancer (H)     10 years     Breast cyst      Hx antineoplastic chemotherapy      Hx of radiation therapy       Past Surgical History:   Procedure Laterality Date     BIOPSY BREAST       HC REMOVAL OF TONSILS,<10 Y/O      Description: Tonsillectomy;  Recorded: 06/13/2007;     HYSTERECTOMY    "    HYSTERECTOMY TOTAL ABDOMINAL  2009     MASTECTOMY Left      OOPHORECTOMY       NM MASTECTOMY, MODIFIED RADICAL      Description: Modified Radical Mastectomy Left Breast;  Proc Date: 06/28/2007;  Comments: with complete lymph node disection.     NM VAGINAL HYSTERECTOMY,UTERUS 250 GMS/<      Description: Vaginal Hysterectomy;  Recorded: 10/29/2009;  Comments: LAVH with BSO.  Cervix removed.       Review of Systems   Constitutional: Negative for chills and fever.   HENT: Positive for ear pain. Negative for congestion, hearing loss and sore throat.    Eyes: Negative for pain and visual disturbance.   Respiratory: Negative for cough and shortness of breath.    Cardiovascular: Negative for chest pain, palpitations and peripheral edema.   Gastrointestinal: Negative for abdominal pain, constipation, diarrhea, heartburn, hematochezia and nausea.   Breasts:  Negative for tenderness, breast mass and discharge.   Genitourinary: Negative for dysuria, frequency, genital sores, hematuria, pelvic pain, urgency, vaginal bleeding and vaginal discharge.   Musculoskeletal: Positive for myalgias. Negative for arthralgias and joint swelling.   Skin: Negative for rash.   Neurological: Negative for dizziness, weakness, headaches and paresthesias.   Psychiatric/Behavioral: Negative for mood changes. The patient is not nervous/anxious.           OBJECTIVE:   BP (!) 158/90   Pulse 80   Temp 98.1  F (36.7  C) (Tympanic)   Resp 16   Ht 1.64 m (5' 4.57\")   Wt 109.3 kg (241 lb)   LMP  (LMP Unknown)   SpO2 98%   BMI 40.64 kg/m    Physical Exam    Physical Exam:  General Appearance: Alert, cooperative, no distress, appears stated age   Head: Normocephalic, without obvious abnormality, atraumatic  Eyes: PERRL, conjunctiva/corneas clear, EOM's intact   Ears: Normal TM's and external ear canals, both ears  Neck: Supple, symmetrical, trachea midline, no adenopathy;  thyroid: not enlarged, symmetric, no tenderness/mass/nodules  Back: " Symmetric, no curvature, ROM normal,  Lungs: Clear to auscultation bilaterally, respirations unlabored  Breasts: left breast surgically absent. No breast masses, tenderness, asymmetry, or nipple discharge.  Heart: Regular rate and rhythm, S1 and S2 normal, no murmur, rub, or gallop  Abdomen: Soft, non-tender, bowel sounds active all four quadrants,  no masses, no organomegaly  Extremities: Extremities normal, atraumatic, no cyanosis, mild tenderness palpation in the lateral right calf with slight swelling on that side but no redness noted.  Skin: Skin color, texture, turgor normal, no rashes or lesions  Lymph nodes: Cervical, supraclavicular, and axillary nodes normal and   Neurologic: Normal        ASSESSMENT/PLAN:   1. Healthcare maintenance  - REVIEW OF HEALTH MAINTENANCE PROTOCOL ORDERS  - TSH with free T4 reflex; Future  - CBC with Platelets; Future  - BASIC METABOLIC PANEL; Future  - HEMOGLOBIN A1C; Future  - Lipid panel reflex to direct LDL Fasting; Future  - TSH with free T4 reflex  - CBC with Platelets  - BASIC METABOLIC PANEL  - HEMOGLOBIN A1C  - Lipid panel reflex to direct LDL Fasting    2. Benign essential hypertension  Blood pressure elevated both initially and on recheck.  Upon chart review it appears as though it has been elevated in the past as well.  Low-dose hydrochlorothiazide sent to the pharmacy.  Patient will have a cuff and start checking as well.  Repeat labs and blood pressure check in clinic in 2 to 3 weeks.  - hydrochlorothiazide (HYDRODIURIL) 12.5 MG tablet; Take 1 tablet (12.5 mg) by mouth daily  Dispense: 90 tablet; Refill: 3    3. Right calf pain  Ultrasound ordered to rule out DVT.  Suspect that this is more likely muscular.  - US Lower Extremity Venous Duplex Right; Future    4. Hypovitaminosis D  - Vitamin D Deficiency; Future  - Vitamin D Deficiency    5. Screen for colon cancer  - COLOGUARD([employer]); Future    6. Morbid obesity (H)  Discussed doing physically active.    7.  "Screening for HIV (human immunodeficiency virus)  - HIV Antigen Antibody Combo; Future  - HIV Antigen Antibody Combo    8. Need for hepatitis C screening test  - Hepatitis C Screen Reflex to HCV RNA Quant and Genotype; Future  - Hepatitis C Screen Reflex to HCV RNA Quant and Genotype    9. Rosacea  Refill metronidazole  - metroNIDAZOLE (METROLOTION) 0.75 % external lotion; Apply to affected area two times daily  Dispense: 59 mL; Refill: 1    10. Post-menopausal  - DX Hip/Pelvis/Spine; Future    11. Malignant neoplasm of upper-outer quadrant of left breast in female, estrogen receptor positive (H)  - DX Hip/Pelvis/Spine; Future    12. Acute cough  Refill  - albuterol (PROAIR HFA/PROVENTIL HFA/VENTOLIN HFA) 108 (90 Base) MCG/ACT inhaler; Inhale 2 puffs into the lungs every 6 hours as needed for shortness of breath / dyspnea or wheezing  Dispense: 18 g; Refill: 1    13. Dermatitis  Patient thinks that the dermatitis on her foot was treated also with metronidazole gel.  Discussed that this is an antibiotic gel and seems less likely but she will check at home and let me know if she needs something different.  - metroNIDAZOLE (METROLOTION) 0.75 % external lotion; Apply to affected area two times daily  Dispense: 59 mL; Refill: 1      Patient has been advised of split billing requirements and indicates understanding: Yes      COUNSELING:  Reviewed preventive health counseling, as reflected in patient instructions      BMI:   Estimated body mass index is 40.64 kg/m  as calculated from the following:    Height as of this encounter: 1.64 m (5' 4.57\").    Weight as of this encounter: 109.3 kg (241 lb).   Weight management plan: Discussed healthy diet and exercise guidelines      She reports that she has never smoked. She has never used smokeless tobacco.          Mitzi Jane MD  [facility]"

## 2022-12-03 ENCOUNTER — MYC MEDICAL ADVICE (OUTPATIENT)
Dept: FAMILY MEDICINE | Facility: CLINIC | Age: 52
End: 2022-12-03

## 2022-12-03 DIAGNOSIS — R05.1 ACUTE COUGH: ICD-10-CM

## 2022-12-03 DIAGNOSIS — L30.9 DERMATITIS: Primary | ICD-10-CM

## 2022-12-04 ENCOUNTER — HOSPITAL ENCOUNTER (OUTPATIENT)
Dept: ULTRASOUND IMAGING | Facility: HOSPITAL | Age: 52
Discharge: HOME | End: 2022-12-04
Attending: FAMILY MEDICINE | Admitting: FAMILY MEDICINE
Payer: COMMERCIAL

## 2022-12-04 DIAGNOSIS — M79.661 RIGHT CALF PAIN: ICD-10-CM

## 2022-12-04 PROCEDURE — 93971 EXTREMITY STUDY: CPT | Mod: RT

## 2022-12-05 DIAGNOSIS — I15.9 SECONDARY HYPERTENSION: Primary | ICD-10-CM

## 2022-12-05 LAB
DEPRECATED CALCIDIOL+CALCIFEROL SERPL-MC: 19 UG/L (ref 20–75)
HCV AB SERPL QL IA: NONREACTIVE
HIV 1+2 AB+HIV1 P24 AG SERPL QL IA: NONREACTIVE

## 2022-12-05 RX ORDER — ALBUTEROL SULFATE 90 UG/1
2 AEROSOL, METERED RESPIRATORY (INHALATION) EVERY 6 HOURS PRN
Qty: 8.5 G | Refills: 1 | Status: SHIPPED | OUTPATIENT
Start: 2022-12-05

## 2022-12-05 NOTE — PROGRESS NOTES
"Nichol Gee has an upcoming lab appointment:    Future Appointments   Date Time Provider Department Center   12/23/2022  1:30 PM HU MA/LPN [facility] [place]   12/23/2022  2:00 PM HU LAB [facility]   5/31/2023  1:00 PM [id number] MDBRCR [facility]   5/31/2023  1:30 PM MICDX1 JNDXIC [facility]     Patient is scheduled for the following lab(s): Lab appointment note states, \"Labs ordered by Lucinda.\"    There is no order available. Please review and place either future orders or HMPO (Review of Health Maintenance Protocol Orders), as appropriate.    There are no preventive care reminders to display for this patient.    Thank you!  Bettie Rey"

## 2022-12-15 LAB — NONINV COLON CA DNA+OCC BLD SCRN STL QL: NEGATIVE

## 2022-12-23 ENCOUNTER — LAB (OUTPATIENT)
Dept: LAB | Facility: CLINIC | Age: 52
End: 2022-12-23
Payer: COMMERCIAL

## 2022-12-23 ENCOUNTER — ALLIED HEALTH/NURSE VISIT (OUTPATIENT)
Dept: FAMILY MEDICINE | Facility: CLINIC | Age: 52
End: 2022-12-23
Payer: COMMERCIAL

## 2022-12-23 VITALS — DIASTOLIC BLOOD PRESSURE: 84 MMHG | HEART RATE: 70 BPM | SYSTOLIC BLOOD PRESSURE: 122 MMHG

## 2022-12-23 DIAGNOSIS — I15.9 SECONDARY HYPERTENSION: ICD-10-CM

## 2022-12-23 DIAGNOSIS — I10 BENIGN ESSENTIAL HYPERTENSION: Primary | ICD-10-CM

## 2022-12-23 LAB
ANION GAP SERPL CALCULATED.3IONS-SCNC: 10 MMOL/L (ref 7–15)
BUN SERPL-MCNC: 11.2 MG/DL (ref 6–20)
CALCIUM SERPL-MCNC: 9.7 MG/DL (ref 8.6–10)
CHLORIDE SERPL-SCNC: 101 MMOL/L (ref 98–107)
CREAT SERPL-MCNC: 0.81 MG/DL (ref 0.51–0.95)
DEPRECATED HCO3 PLAS-SCNC: 31 MMOL/L (ref 22–29)
GFR SERPL CREATININE-BSD FRML MDRD: 86 ML/MIN/1.73M2
GLUCOSE SERPL-MCNC: 161 MG/DL (ref 70–99)
POTASSIUM SERPL-SCNC: 4.1 MMOL/L (ref 3.4–5.3)
SODIUM SERPL-SCNC: 142 MMOL/L (ref 136–145)

## 2022-12-23 PROCEDURE — 80048 BASIC METABOLIC PNL TOTAL CA: CPT

## 2022-12-23 PROCEDURE — 99207 PR NO CHARGE NURSE ONLY: CPT

## 2022-12-23 PROCEDURE — 36415 COLL VENOUS BLD VENIPUNCTURE: CPT

## 2022-12-23 NOTE — PROGRESS NOTES
SUBJECTIVE:  Nichol Gee is a 52 year old female who presents for a follow up evaluation of her hypertension.    The reason for the visit is:  a recent medication change    Patient is taking medication as prescribed  Patient is tolerating medications well.  Patient is monitoring Blood Pressure at home.  Average readings if yes are 140s.    Current complaints: none      Current Outpatient Medications   Medication     albuterol (PROAIR HFA/PROVENTIL HFA/VENTOLIN HFA) 108 (90 Base) MCG/ACT inhaler     hydrochlorothiazide (HYDRODIURIL) 12.5 MG tablet     metroNIDAZOLE (METROLOTION) 0.75 % external lotion     oxiconazole nitrate (OXISTAT) 1 % external lotion     No current facility-administered medications for this visit.       Allergies   Allergen Reactions     Contrast Dye      Contrast Media Ready-Box MISC, 05/22/2009.       Hydrocodone-Acetaminophen Nausea         OBJECTIVE:  Please get a blood pressure AND a pulse.  A height is also needed if has not been done in the past year.    LMP  (LMP Unknown)     Vitals as recorded, a regular long cuff was used.    ASSESSMENT:    Is the HYPERTENSION goal on the problem list? Yes  Patient Active Problem List   Diagnosis     Asymptomatic varicose veins     Family history of diabetes mellitus     H/O mastectomy, left     Hyperlipidemia     Malignant neoplasm of upper-outer quadrant of left breast in female, estrogen receptor positive (H)     Obesity     Poisoning by drug     Tietze's disease     Wheezing     Morbid obesity (H)     Lateral epicondylitis of right elbow     Pain in right elbow     Abdominal pain, other specified site       Plan:  The patient's blood pressure is less than documented goal. The patient will be discharged home. CC: this note to the patient's primary provider. Mitzi Jane MD, [facility]

## 2023-05-31 ENCOUNTER — ANCILLARY PROCEDURE (OUTPATIENT)
Dept: MAMMOGRAPHY | Facility: CLINIC | Age: 53
End: 2023-05-31
Attending: FAMILY MEDICINE
Payer: COMMERCIAL

## 2023-05-31 ENCOUNTER — HOSPITAL ENCOUNTER (OUTPATIENT)
Dept: BONE DENSITY | Facility: HOSPITAL | Age: 53
Discharge: HOME | End: 2023-05-31
Attending: FAMILY MEDICINE
Payer: COMMERCIAL

## 2023-05-31 DIAGNOSIS — C50.412 MALIGNANT NEOPLASM OF UPPER-OUTER QUADRANT OF LEFT BREAST IN FEMALE, ESTROGEN RECEPTOR POSITIVE (H): ICD-10-CM

## 2023-05-31 DIAGNOSIS — Z17.0 MALIGNANT NEOPLASM OF UPPER-OUTER QUADRANT OF LEFT BREAST IN FEMALE, ESTROGEN RECEPTOR POSITIVE (H): ICD-10-CM

## 2023-05-31 DIAGNOSIS — Z78.0 POST-MENOPAUSAL: ICD-10-CM

## 2023-05-31 DIAGNOSIS — Z12.31 VISIT FOR SCREENING MAMMOGRAM: ICD-10-CM

## 2023-05-31 PROCEDURE — 77067 SCR MAMMO BI INCL CAD: CPT | Mod: 52

## 2023-05-31 PROCEDURE — 77080 DXA BONE DENSITY AXIAL: CPT

## 2023-08-10 ENCOUNTER — ONCOLOGY VISIT (OUTPATIENT)
Dept: ONCOLOGY | Facility: HOSPITAL | Age: 53
End: 2023-08-10
Attending: INTERNAL MEDICINE
Payer: COMMERCIAL

## 2023-08-10 ENCOUNTER — PATIENT OUTREACH (OUTPATIENT)
Dept: ONCOLOGY | Facility: HOSPITAL | Age: 53
End: 2023-08-10

## 2023-08-10 VITALS
HEIGHT: 65 IN | RESPIRATION RATE: 16 BRPM | OXYGEN SATURATION: 94 % | SYSTOLIC BLOOD PRESSURE: 135 MMHG | HEART RATE: 80 BPM | BODY MASS INDEX: 40.65 KG/M2 | DIASTOLIC BLOOD PRESSURE: 80 MMHG | WEIGHT: 244 LBS

## 2023-08-10 DIAGNOSIS — Z17.0 MALIGNANT NEOPLASM OF UPPER-OUTER QUADRANT OF LEFT BREAST IN FEMALE, ESTROGEN RECEPTOR POSITIVE (H): Primary | ICD-10-CM

## 2023-08-10 DIAGNOSIS — C50.412 MALIGNANT NEOPLASM OF UPPER-OUTER QUADRANT OF LEFT BREAST IN FEMALE, ESTROGEN RECEPTOR POSITIVE (H): Primary | ICD-10-CM

## 2023-08-10 DIAGNOSIS — E78.2 MODERATE MIXED HYPERLIPIDEMIA NOT REQUIRING STATIN THERAPY: ICD-10-CM

## 2023-08-10 PROCEDURE — G0463 HOSPITAL OUTPT CLINIC VISIT: HCPCS | Performed by: INTERNAL MEDICINE

## 2023-08-10 PROCEDURE — 99214 OFFICE O/P EST MOD 30 MIN: CPT | Performed by: INTERNAL MEDICINE

## 2023-08-10 ASSESSMENT — PAIN SCALES - GENERAL: PAINLEVEL: NO PAIN (0)

## 2023-08-10 NOTE — PROGRESS NOTES
[facility] cancer Care Progress Note    Patient: Nichol Gee  MRN: [id number]   Date of Service: Aug 10, 2023         Reason for visit      1. Malignant neoplasm of upper-outer quadrant of left breast in female, estrogen receptor positive (H)        Assessment     1.         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   Subsequent to that, she has been on aromatase inhibitor therapy from 02/2008 to July 2015.  Currently on observation. Slight fibronudularity seen on her mammogram.  2.         Poor CYP2D6 metabolism.  3.         Normal bone density..  4.         Health maintenance issues.  She has some hypertension, elevated blood sugar levels, borderline elevated hemoglobin A1c as well as poor lipid panel.    Plan     1.  Continue with active surveillance.  Yearly mammogram and checkup.  2.  Repeat lipid panel to see if the diuretic is making any impact on the profile.  3.  Continue good diet and proper exercise.  4.  Annual mammogram.  5.  Next bone density in 2025.        Clinical stage      Infiltrating Lobular Carcinoma Of The Breast Mixed With Other Types Of Carcinoma    Primary site: Breast (Left)    Clinical free text: ER+,MN+,[id number] positive by FISH    Clinical: Stage IIIA (T3, N1, cM0) signed by Jersey Ramos MD on 7/13/2015 11:05 AM    Pathologic: Stage IIIA (T3, N1a, cM0) signed by Jersey Ramos MD on 7/13/2015 11:05 AM    Summary: Stage IIIA (T3, N1a, cM0)      History     Nichol Gee is a very pleasant 52 year old  female with a history of breast cancer diagnosed in June 2008, premenopausal at the time of diagnosis, located on the left side, presenting as a lump, measuring 5.2 cm in size, 1 positive lymph node, ER/MN positive, HER-2/gustavo 2+ with  "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  Subsequent to that, she was treated with Taxotere, carboplatin and Herceptin for 6 cycles and subsequent to that, she was put on hormone therapy with tamoxifen but was found to have very poor CYP2D6 metabolism so she was started on aromatase inhibitor after being on Lupron therapy.  Subsequent to that, she underwent a total abdominal hysterectomy in  and was on aromatase inhibitors with anastrozole from 2008 to 2015.  She stopped after that.    She is also known to have osteoporosis and she is unable to tolerate oral bisphosphonates.  She got once a year Zometa. Bone density in  was normal. So Zometa was stopped in .    On the positive side her younger daughter has now gone to college.  She is happy about that.    Her mother passed away in May 2022 from dementia associated complications.    Nichol comes in today for scheduled follow-up.  Overall she is doing well.  She did have COVID in 2022.  Did okay otherwise.  Occasionally feels some sensation in her right breast which is slightly painful at times.  Has been started on small dose of HCTZ for blood pressure control.    As further review of family history is concerned sister  from some heart issues in 2023.    Past Medical History     Past Medical History:   Diagnosis Date    Breast cancer (H)     11 years    Breast cyst     Hx antineoplastic chemotherapy     Hx of radiation therapy        Review of Systems     A detailed review of systems was obtained.  Positive findings noted in the history.  Rest of the review of system is otherwise negative.     Vital Signs     /80   Pulse 80   Resp 16   Ht 1.645 m (5' 4.75\")   Wt 110.7 kg (244 lb)   LMP  (LMP Unknown)   SpO2 94%   BMI 40.92 kg/m       Physical Exam     GENERAL: No acute distress. Cooperative in conversation.   HEENT: Pupils are equal, round and reactive. Oral mucosa is clean and intact. No ulcerations or mucositis noted. No bleeding " noted.  RESP:Chest symmetric lungs are clear bilaterally per auscultation. Regular respiratory rate. No wheezes or rhonchi.  CV: Normal S1 S2 Regular, rate and rhythm. No murmurs.  ABD: Nondistended, soft, nontender. Positive bowel sounds. No organomegaly.   EXTREMITIES: Trace bilateral lower extremity edema.   NEURO: Non- focal. Alert and oriented x3.  Cranial nerves appear intact.  PSYCH: Within normal limits. No depression or anxiety.  SKIN: Warm dry intact.  Radiation skin changes on her left chest wall.  BREAST: Bilateral breast examination done.  Status postmastectomy on the left side with radiation skin changes.  The right breast has some lumpiness in it.  Cystlike feeling in 3 o'clock position.  No nipple discharge.  Bilateral axillary examination is negative.      Lab Results     No results found for this or any previous visit (from the past 240 hour(s)).     Imaging Results     No results found.     Jersey Ramos MD

## 2023-08-10 NOTE — PROGRESS NOTES
"Oncology Rooming Note    August 10, 2023 1:52 PM   Nichol Gee is a 52 year old female who presents for:    Chief Complaint   Patient presents with    Oncology Clinic Visit     Malignant neoplasm of upper-outer quadrant of left breast in female, estrogen receptor positive     Initial Vitals: /80   Pulse 80   Resp 16   Ht 1.645 m (5' 4.75\")   Wt 110.7 kg (244 lb)   LMP  (LMP Unknown)   SpO2 94%   BMI 40.92 kg/m   Estimated body mass index is 40.92 kg/m  as calculated from the following:    Height as of this encounter: 1.645 m (5' 4.75\").    Weight as of this encounter: 110.7 kg (244 lb). Body surface area is 2.25 meters squared.  No Pain (0) Comment: Data Unavailable   No LMP recorded (lmp unknown). Patient has had a hysterectomy.  Allergies reviewed: Yes  Medications reviewed: Yes    Medications: Medication refills not needed today.  Pharmacy name entered into [employer]: [id number] - [place], [place] - [id number] [address]    Clinical concerns:  1 year follow up      Genesis Croft            "

## 2023-08-10 NOTE — LETTER
"    8/10/2023         RE: Nichol Gee  [address] [facility]  [place] [address]        Dear Colleague,    Thank you for referring your patient, Nichol Gee, to the [facility]CER [facility]. Please see a copy of my visit note below.    Oncology Rooming Note    August 10, 2023 1:52 PM   Nichol Gee is a 52 year old female who presents for:    Chief Complaint   Patient presents with     Oncology Clinic Visit     Malignant neoplasm of upper-outer quadrant of left breast in female, estrogen receptor positive     Initial Vitals: /80   Pulse 80   Resp 16   Ht 1.645 m (5' 4.75\")   Wt 110.7 kg (244 lb)   LMP  (LMP Unknown)   SpO2 94%   BMI 40.92 kg/m   Estimated body mass index is 40.92 kg/m  as calculated from the following:    Height as of this encounter: 1.645 m (5' 4.75\").    Weight as of this encounter: 110.7 kg (244 lb). Body surface area is 2.25 meters squared.  No Pain (0) Comment: Data Unavailable   No LMP recorded (lmp unknown). Patient has had a hysterectomy.  Allergies reviewed: Yes  Medications reviewed: Yes    Medications: Medication refills not needed today.  Pharmacy name entered into [employer]: [id number] - [place], [place] - [address] [address]    Clinical concerns:  1 year follow up      Genesis Croft                [facility] Progress Note    Patient: Nichol Gee  MRN: [id number]   Date of Service: Aug 10, 2023         Reason for visit      1. Malignant neoplasm of upper-outer quadrant of left breast in female, estrogen receptor positive (H)        Assessment     1.         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   Subsequent to that, she has been on aromatase inhibitor therapy " from 02/2008 to July 2015.  Currently on observation. Slight fibronudularity seen on her mammogram.  2.         Poor CYP2D6 metabolism.  3.         Normal bone density..  4.         Health maintenance issues.  She has some hypertension, elevated blood sugar levels, borderline elevated hemoglobin A1c as well as poor lipid panel.    Plan     1.  Continue with active surveillance.  Yearly mammogram and checkup.  2.  Repeat lipid panel to see if the diuretic is making any impact on the profile.  3.  Continue good diet and proper exercise.  4.  Annual mammogram.  5.  Next bone density in 2025.        Clinical stage      Infiltrating Lobular Carcinoma Of The Breast Mixed With Other Types Of Carcinoma    Primary site: Breast (Left)    Clinical free text: ER+,ND+,Gkn9aor positive by FISH    Clinical: Stage IIIA (T3, N1, cM0) signed by Jersey Ramos MD on 7/13/2015 11:05 AM    Pathologic: Stage IIIA (T3, N1a, cM0) signed by Jersey Ramos MD on 7/13/2015 11:05 AM    Summary: Stage IIIA (T3, N1a, cM0)      History     Nichol Gee is a very pleasant 52 year old  female with a history of breast cancer diagnosed in June 2008, premenopausal at the time of diagnosis, located on the left side, presenting as a lump, measuring 5.2 cm in size, 1 positive lymph node, ER/ND positive, HER-2/gustavo 2+ 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  Subsequent to that, she was treated with Taxotere, carboplatin and Herceptin for 6 cycles and subsequent to that, she was put on hormone therapy with tamoxifen but was found to have very poor CYP2D6 metabolism so she was started on aromatase inhibitor after being on Lupron therapy.  Subsequent to that, she underwent a total abdominal hysterectomy in 2009 and was on aromatase inhibitors with anastrozole from 02/2008 to July 2015.  She stopped after that.    She is also known to have osteoporosis and she is unable to tolerate oral bisphosphonates.  She got once a year Zometa. Bone density in 2016 was  "normal. So Zometa was stopped in .    On the positive side her younger daughter has now gone to college.  She is happy about that.    Her mother passed away in May 2022 from dementia associated complications.    Nichol comes in today for scheduled follow-up.  Overall she is doing well.  She did have COVID in 2022.  Did okay otherwise.  Occasionally feels some sensation in her right breast which is slightly painful at times.  Has been started on small dose of HCTZ for blood pressure control.    As further review of family history is concerned sister  from some heart issues in 2023.    Past Medical History     Past Medical History:   Diagnosis Date     Breast cancer (H)     11 years     Breast cyst      Hx antineoplastic chemotherapy      Hx of radiation therapy        Review of Systems     A detailed review of systems was obtained.  Positive findings noted in the history.  Rest of the review of system is otherwise negative.     Vital Signs     /80   Pulse 80   Resp 16   Ht 1.645 m (5' 4.75\")   Wt 110.7 kg (244 lb)   LMP  (LMP Unknown)   SpO2 94%   BMI 40.92 kg/m       Physical Exam     GENERAL: No acute distress. Cooperative in conversation.   HEENT: Pupils are equal, round and reactive. Oral mucosa is clean and intact. No ulcerations or mucositis noted. No bleeding noted.  RESP:Chest symmetric lungs are clear bilaterally per auscultation. Regular respiratory rate. No wheezes or rhonchi.  CV: Normal S1 S2 Regular, rate and rhythm. No murmurs.  ABD: Nondistended, soft, nontender. Positive bowel sounds. No organomegaly.   EXTREMITIES: Trace bilateral lower extremity edema.   NEURO: Non- focal. Alert and oriented x3.  Cranial nerves appear intact.  PSYCH: Within normal limits. No depression or anxiety.  SKIN: Warm dry intact.  Radiation skin changes on her left chest wall.  BREAST: Bilateral breast examination done.  Status postmastectomy on the left side with radiation skin changes.  " The right breast has some lumpiness in it.  Cystlike feeling in 3 o'clock position.  No nipple discharge.  Bilateral axillary examination is negative.      Lab Results     No results found for this or any previous visit (from the past 240 hour(s)).     Imaging Results     No results found.     Jersey Ramos MD       Again, thank you for allowing me to participate in the care of your patient.        Sincerely,        Jersey Ramos MD, MD

## 2023-08-18 ENCOUNTER — LAB (OUTPATIENT)
Dept: LAB | Facility: CLINIC | Age: 53
End: 2023-08-18
Payer: COMMERCIAL

## 2023-08-18 DIAGNOSIS — E78.2 MODERATE MIXED HYPERLIPIDEMIA NOT REQUIRING STATIN THERAPY: ICD-10-CM

## 2023-08-18 LAB
CHOLEST SERPL-MCNC: 198 MG/DL
HDLC SERPL-MCNC: 29 MG/DL
LDLC SERPL CALC-MCNC: 122 MG/DL
NONHDLC SERPL-MCNC: 169 MG/DL
TRIGL SERPL-MCNC: 233 MG/DL

## 2023-08-18 PROCEDURE — 80061 LIPID PANEL: CPT

## 2023-08-18 PROCEDURE — 36415 COLL VENOUS BLD VENIPUNCTURE: CPT

## 2023-10-06 ENCOUNTER — OFFICE VISIT (OUTPATIENT)
Dept: FAMILY MEDICINE | Facility: CLINIC | Age: 53
End: 2023-10-06
Payer: COMMERCIAL

## 2023-10-06 VITALS
DIASTOLIC BLOOD PRESSURE: 72 MMHG | HEIGHT: 65 IN | TEMPERATURE: 98.9 F | OXYGEN SATURATION: 96 % | HEART RATE: 94 BPM | SYSTOLIC BLOOD PRESSURE: 118 MMHG | RESPIRATION RATE: 16 BRPM | BODY MASS INDEX: 40.36 KG/M2 | WEIGHT: 242.25 LBS

## 2023-10-06 DIAGNOSIS — L30.9 DERMATITIS: ICD-10-CM

## 2023-10-06 DIAGNOSIS — E11.9 CONTROLLED TYPE 2 DIABETES MELLITUS WITHOUT COMPLICATION, WITHOUT LONG-TERM CURRENT USE OF INSULIN (H): ICD-10-CM

## 2023-10-06 DIAGNOSIS — I10 BENIGN ESSENTIAL HYPERTENSION: ICD-10-CM

## 2023-10-06 DIAGNOSIS — E66.01 MORBID OBESITY (H): ICD-10-CM

## 2023-10-06 DIAGNOSIS — E55.9 HYPOVITAMINOSIS D: Primary | ICD-10-CM

## 2023-10-06 DIAGNOSIS — Z13.1 SCREENING FOR DIABETES MELLITUS: ICD-10-CM

## 2023-10-06 LAB
ANION GAP SERPL CALCULATED.3IONS-SCNC: 11 MMOL/L (ref 7–15)
BUN SERPL-MCNC: 11.5 MG/DL (ref 6–20)
CALCIUM SERPL-MCNC: 9.9 MG/DL (ref 8.6–10)
CHLORIDE SERPL-SCNC: 100 MMOL/L (ref 98–107)
CREAT SERPL-MCNC: 0.94 MG/DL (ref 0.51–0.95)
DEPRECATED HCO3 PLAS-SCNC: 29 MMOL/L (ref 22–29)
EGFRCR SERPLBLD CKD-EPI 2021: 72 ML/MIN/1.73M2
GLUCOSE SERPL-MCNC: 120 MG/DL (ref 70–99)
HBA1C MFR BLD: 6.5 % (ref 0–5.6)
POTASSIUM SERPL-SCNC: 4.1 MMOL/L (ref 3.4–5.3)
SODIUM SERPL-SCNC: 140 MMOL/L (ref 135–145)
VIT D+METAB SERPL-MCNC: 25 NG/ML (ref 20–50)

## 2023-10-06 PROCEDURE — 90750 HZV VACC RECOMBINANT IM: CPT | Performed by: FAMILY MEDICINE

## 2023-10-06 PROCEDURE — 82306 VITAMIN D 25 HYDROXY: CPT | Performed by: FAMILY MEDICINE

## 2023-10-06 PROCEDURE — 83036 HEMOGLOBIN GLYCOSYLATED A1C: CPT | Performed by: FAMILY MEDICINE

## 2023-10-06 PROCEDURE — 36415 COLL VENOUS BLD VENIPUNCTURE: CPT | Performed by: FAMILY MEDICINE

## 2023-10-06 PROCEDURE — 80048 BASIC METABOLIC PNL TOTAL CA: CPT | Performed by: FAMILY MEDICINE

## 2023-10-06 PROCEDURE — 90471 IMMUNIZATION ADMIN: CPT | Performed by: FAMILY MEDICINE

## 2023-10-06 PROCEDURE — 99215 OFFICE O/P EST HI 40 MIN: CPT | Mod: 25 | Performed by: FAMILY MEDICINE

## 2023-10-06 RX ORDER — FLUCONAZOLE 150 MG/1
150 TABLET ORAL
Qty: 10 TABLET | Refills: 0 | Status: SHIPPED | OUTPATIENT
Start: 2023-10-06

## 2023-10-06 NOTE — PROGRESS NOTES
Answers submitted by the patient for this visit:  General Questionnaire (Submitted on 10/6/2023)  Chief Complaint: Chronic problems general questions HPI Form  What is the reason for your visit today? : med refill  How many servings of fruits and vegetables do you eat daily?: 2-3  On average, how many sweetened beverages do you drink each day (Examples: soda, juice, sweet tea, etc.  Do NOT count diet or artificially sweetened beverages)?: 1  How many minutes a day do you exercise enough to make your heart beat faster?: 9 or less  How many days a week do you exercise enough to make your heart beat faster?: 3 or less  How many days per week do you miss taking your medication?: 1    Assessment/Plan:    Nichol Gee is a 52 year old female presenting for:    Controlled Type 2 diabetes mellitus without complication, without long-term current use of insulin (H)  Patient's A1c today unfortunately came back at 6.5%.  This was 6.2% about 10 months ago.  I called the patient and discussed that this confirms her diagnosis of diabetes.  We discussed a myriad of options and she has chosen to talk with a diabetic educator and begin checking her blood sugars.  It is unfortunate that she is losing insurance at the end of this month and so hopefully will be able to find [facility] or other care.    In our discussions patient does state that she drinks 2 (or more) cans of Mountain Dew daily.  Discussed that she should stop drinking the soda (may be switched to diet as she weans off) as this will likely help.  We should do at least a lab recheck in 3 to 6 months.  - AMB Adult Diabetes Educator Referral  - blood glucose monitoring (NO BRAND SPECIFIED) meter device kit  Dispense: 1 kit; Refill: 0  - blood glucose (NO BRAND SPECIFIED) test strip  Dispense: 100 strip; Refill: 6  - thin (NO BRAND SPECIFIED) lancets  Dispense: 100 each; Refill: 6    Benign essential hypertension  Blood pressure at goal today  - Hemoglobin A1c  - Basic  metabolic panel  (Ca, Cl, CO2, Creat, Gluc, K, Na, BUN)  - Hemoglobin A1c  - Basic metabolic panel  (Ca, Cl, CO2, Creat, Gluc, K, Na, BUN)    Dermatitis  Refill medications  - oxiconazole nitrate (OXISTAT) 1 % external lotion  Dispense: 30 mL; Refill: 11  - fluconazole (DIFLUCAN) 150 MG tablet  Dispense: 10 tablet; Refill: 0    Hypovitaminosis D    - Vitamin D Deficiency  - Vitamin D Deficiency    Screening for diabetes mellitus  - Hemoglobin A1c  - Hemoglobin A1c      Medications Discontinued During This Encounter   Medication Reason    oxiconazole nitrate (OXISTAT) 1 % external lotion Reorder (No AVS)       I personally spent over 45 minutes performing care related to this patient on the day of the patient visit.  This includes chart review, precharting, talking with/ examining the patient, calling patient about her lab results as well as completing after visit documentation.      Chief Complaint:  Recheck Medication        Subjective:   Nichol Gee is a 52-year-old female who presents to the clinic today for a medication check.    She is not yet due for her complete physical.  She states that her insurance will be running out at the end of this month.  She is in works of finding different insurance but would like to get medication checked and refilled prior to running out.    She unfortunately has had a difficult year with some deaths in the family and she recently lost her job as well.    She has no specific questions or concerns today otherwise.    12 point review of systems completed and negative except for what has been described above    History   Smoking Status    Never   Smokeless Tobacco    Never         Current Outpatient Medications:     albuterol (PROAIR HFA/PROVENTIL HFA/VENTOLIN HFA) 108 (90 Base) MCG/ACT inhaler, Inhale 2 puffs into the lungs every 6 hours as needed for shortness of breath / dyspnea or wheezing, Disp: 8.5 g, Rfl: 1    blood glucose (NO BRAND SPECIFIED) test strip, Use to test  "blood sugar 1 times daily or as directed. To accompany: Blood Glucose Monitor Brands: per insurance., Disp: 100 strip, Rfl: 6    blood glucose monitoring (NO BRAND SPECIFIED) meter device kit, Use to test blood sugar 1 times daily or as directed. Preferred blood glucose meter OR supplies to accompany: Blood Glucose Monitor Brands: per insurance., Disp: 1 kit, Rfl: 0    fluconazole (DIFLUCAN) 150 MG tablet, Take 1 tablet (150 mg) by mouth every 72 hours May repeat in 3 days if having continued symptoms, Disp: 10 tablet, Rfl: 0    hydrochlorothiazide (HYDRODIURIL) 12.5 MG tablet, Take 1 tablet (12.5 mg) by mouth daily, Disp: 90 tablet, Rfl: 0    metroNIDAZOLE (METROLOTION) 0.75 % external lotion, Apply to affected area two times daily, Disp: 59 mL, Rfl: 1    oxiconazole nitrate (OXISTAT) 1 % external lotion, Use to toe twice daily PRN, Disp: 30 mL, Rfl: 11    thin (NO BRAND SPECIFIED) lancets, Use with lanceting device. To accompany: Blood Glucose Monitor Brands: per insurance., Disp: 100 each, Rfl: 6    triamcinolone (KENALOG) 0.1 % external cream, Apply topically 2 times daily, Disp: 45 g, Rfl: 1    oxiconazole nitrate 1 % CREA, Externally apply topically daily as needed, Disp: 60 g, Rfl: 1      Objective:  Vitals:    10/06/23 1532   BP: 118/72   Pulse: 94   Resp: 16   Temp: 98.9  F (37.2  C)   TempSrc: Tympanic   SpO2: 96%   Weight: 109.9 kg (242 lb 4 oz)   Height: 1.645 m (5' 4.75\")       Body mass index is 40.62 kg/m .    Vital signs reviewed and stable  General: No acute distress  Psych: Appropriate affect  HEENT: moist mucous membranes, pupils equal, round, reactive to light and accomodation, tympanic membranes are pearly grey bilaterally  Lymph: no cervical or supraclavicular lymphadenopathy  Cardiovascular: regular rate and rhythm with no murmur  Pulmonary: clear to auscultation bilaterally with no wheeze  Abdomen: soft, non tender, non distended with normo-active bowel sounds  Extremities: warm and well " perfused with no edema  Skin: warm and dry with no rash         This note has been dictated and transcribed using voice recognition software.   Any errors in transcription are unintentional and inherent to the software.

## 2023-10-11 PROBLEM — E11.9 DIABETES MELLITUS, TYPE 2 (H): Status: ACTIVE | Noted: 2023-10-11

## 2023-10-11 RX ORDER — LANCETS
EACH MISCELLANEOUS
Qty: 100 EACH | Refills: 6 | Status: SHIPPED | OUTPATIENT
Start: 2023-10-11 | End: 2024-09-06

## 2023-11-03 ENCOUNTER — PATIENT OUTREACH (OUTPATIENT)
Dept: CARE COORDINATION | Facility: CLINIC | Age: 53
End: 2023-11-03
Payer: COMMERCIAL

## 2023-11-17 ENCOUNTER — PATIENT OUTREACH (OUTPATIENT)
Dept: CARE COORDINATION | Facility: CLINIC | Age: 53
End: 2023-11-17
Payer: COMMERCIAL

## 2023-12-11 ENCOUNTER — NURSE TRIAGE (OUTPATIENT)
Dept: FAMILY MEDICINE | Facility: CLINIC | Age: 53
End: 2023-12-11
Payer: COMMERCIAL

## 2023-12-11 NOTE — TELEPHONE ENCOUNTER
Symptoms    Describe your symptoms: cut finger yesterday with vegetable chente, minimal bleeding when changing bandage.  Missing skin.  Wondering if she needs to be seen.    Any pain: Yes: very painful    How long have you been having symptoms: since yesterday  around 4-5pm      Home remedies tried: put tourniquet on and antibiotic  ointment     Preferred Pharmacy:   [facility][id number] [place], [place] - [address] [address] AT [address]  [address] [facility] [address]  Phone: [phone] Fax: [phone]      Could we send this information to you in [employer] or would you prefer to receive a phone call?:   Patient would prefer a phone call   Okay to leave a detailed message?: Yes at Cell number on file:    Telephone Information:   Mobile [phone]

## 2023-12-11 NOTE — TELEPHONE ENCOUNTER
"Nurse Triage SBAR    Is this a 2nd Level Triage? NO    Situation: Scrape to L) Index finger     Background: Patient was peeling vegetables with a new vegetable chente and the chente missed the potato and peeled a layer of skin off of her left index finger last evening     Assessment: Patient reports scrape is approx 1\"x1\" in size to her left index finger   Skin is peeled away and there are ridges from the chente   Patient attempting to send a mychart with photos   Patient reports wound is red in color     Reporting minimal drainage from wound with bandaid changes  Applying Neosporin to wound and covering with a bandaid    Reporting increased pain with dressing changes but improves 30 minutes later    Denies redness or warmth surrounding the wound  Denies severe pain  Denies numbness/tingling to finger tip    Patient's last Tdap was in 2020   Patient wondering if sutures or steri strips are needed for the \"ridges\" in the scrape     Protocol Recommended Disposition:   Home Care    Recommendation: Advised to keep wound clean and dry   Use triple antibiotic ointment and cover with a bandaid   Allow the wound to be open to air periodically  Monitor for signs of infection   Requested patient submit a photo via mychart - patient was unable to send at her current location but will try again    Routed to provider    Does the patient meet one of the following criteria for ADS visit consideration? No    Alex Nicholson, Clinic RN  [facility]       "

## 2023-12-11 NOTE — TELEPHONE ENCOUNTER
Patient sent a picture but it was difficult to determine the depth of the wound.  I recommended over my chart to her that if she feels as though this needs stitches she should go to urgent care.  Otherwise, I think the advice that you have given is perfect.    Thanks    EB

## 2023-12-22 DIAGNOSIS — I10 BENIGN ESSENTIAL HYPERTENSION: ICD-10-CM

## 2023-12-22 RX ORDER — HYDROCHLOROTHIAZIDE 12.5 MG/1
12.5 TABLET ORAL DAILY
Qty: 90 TABLET | Refills: 0 | Status: SHIPPED | OUTPATIENT
Start: 2023-12-22 | End: 2024-09-06

## 2023-12-26 ENCOUNTER — ALLIED HEALTH/NURSE VISIT (OUTPATIENT)
Dept: FAMILY MEDICINE | Facility: CLINIC | Age: 53
End: 2023-12-26
Payer: COMMERCIAL

## 2023-12-26 DIAGNOSIS — Z23 NEED FOR VACCINATION: Primary | ICD-10-CM

## 2023-12-26 PROCEDURE — 99207 PR NO CHARGE NURSE ONLY: CPT

## 2023-12-26 PROCEDURE — 90471 IMMUNIZATION ADMIN: CPT

## 2023-12-26 PROCEDURE — 90750 HZV VACC RECOMBINANT IM: CPT

## 2023-12-26 NOTE — PROGRESS NOTES
Prior to immunization administration, verified patients identity using patient s name and date of birth. Please see Immunization Activity for additional information.     Screening Questionnaire for Adult Immunization    Are you sick today?   No   Do you have allergies to medications, food, a vaccine component or latex?   No   Have you ever had a serious reaction after receiving a vaccination?   No   Do you have a long-term health problem with heart, lung, kidney, or metabolic disease (e.g., diabetes), asthma, a blood disorder, no spleen, complement component deficiency, a cochlear implant, or a spinal fluid leak?  Are you on long-term aspirin therapy?   No   Do you have cancer, leukemia, HIV/AIDS, or any other immune system problem?   No   Do you have a parent, brother, or sister with an immune system problem?   No   In the past 3 months, have you taken medications that affect  your immune system, such as prednisone, other steroids, or anticancer drugs; drugs for the treatment of rheumatoid arthritis, Crohn s disease, or psoriasis; or have you had radiation treatments?   No   Have you had a seizure, or a brain or other nervous system problem?   No   During the past year, have you received a transfusion of blood or blood    products, or been given immune (gamma) globulin or antiviral drug?   No   For women: Are you pregnant or is there a chance you could become       pregnant during the next month?   No   Have you received any vaccinations in the past 4 weeks?   No     Immunization questionnaire answers were all negative.    I have reviewed the following standing orders:   This patient is due and qualifies for the Zoster vaccine.    Click here for Zoster Standing Order    I have reviewed the vaccines inclusion and exclusion criteria; No concerns regarding eligibility.     Patient instructed to remain in clinic for 15 minutes afterwards, and to report any adverse reactions.     Screening performed by Linda Najera on  12/26/2023 at 3:29 PM.

## 2024-02-12 DIAGNOSIS — E11.9 CONTROLLED TYPE 2 DIABETES MELLITUS WITHOUT COMPLICATION, WITHOUT LONG-TERM CURRENT USE OF INSULIN (H): Primary | ICD-10-CM

## 2024-02-12 NOTE — PROGRESS NOTES
Nichol Gee has an upcoming lab appointment:    Future Appointments   Date Time Provider Department Center   2/14/2024  9:30 AM [facility]     Patient is scheduled for lab, and there is no order available. Please review and place either future orders or HMPO (Review of Health Maintenance Protocol Orders), as appropriate.    (Please let me know if I need to contact someone else. It's unclear where the orders are supposed to come from.)    Health Maintenance Due   Topic    MICROALBUMIN     ANNUAL REVIEW OF HM ORDERS     A1C      Thank you  Bettie Rey

## 2024-02-14 ENCOUNTER — LAB (OUTPATIENT)
Dept: LAB | Facility: CLINIC | Age: 54
End: 2024-02-14
Payer: COMMERCIAL

## 2024-02-14 DIAGNOSIS — E11.9 CONTROLLED TYPE 2 DIABETES MELLITUS WITHOUT COMPLICATION, WITHOUT LONG-TERM CURRENT USE OF INSULIN (H): ICD-10-CM

## 2024-02-14 LAB
CREAT UR-MCNC: 89.3 MG/DL
HBA1C MFR BLD: 6.2 % (ref 0–5.6)
MICROALBUMIN UR-MCNC: <12 MG/L
MICROALBUMIN/CREAT UR: NORMAL MG/G{CREAT}

## 2024-02-14 PROCEDURE — 82570 ASSAY OF URINE CREATININE: CPT

## 2024-02-14 PROCEDURE — 36415 COLL VENOUS BLD VENIPUNCTURE: CPT

## 2024-02-14 PROCEDURE — 82043 UR ALBUMIN QUANTITATIVE: CPT

## 2024-02-14 PROCEDURE — 83036 HEMOGLOBIN GLYCOSYLATED A1C: CPT

## 2024-03-02 ENCOUNTER — HEALTH MAINTENANCE LETTER (OUTPATIENT)
Age: 54
End: 2024-03-02

## 2024-05-01 ENCOUNTER — PATIENT OUTREACH (OUTPATIENT)
Dept: CARE COORDINATION | Facility: CLINIC | Age: 54
End: 2024-05-01
Payer: COMMERCIAL

## 2024-06-03 ENCOUNTER — ANCILLARY PROCEDURE (OUTPATIENT)
Dept: MAMMOGRAPHY | Facility: CLINIC | Age: 54
End: 2024-06-03
Attending: INTERNAL MEDICINE
Payer: COMMERCIAL

## 2024-06-03 DIAGNOSIS — Z12.31 VISIT FOR SCREENING MAMMOGRAM: ICD-10-CM

## 2024-06-03 PROCEDURE — 77063 BREAST TOMOSYNTHESIS BI: CPT | Mod: 52

## 2024-06-10 ENCOUNTER — ANCILLARY PROCEDURE (OUTPATIENT)
Dept: MAMMOGRAPHY | Facility: CLINIC | Age: 54
End: 2024-06-10
Attending: INTERNAL MEDICINE
Payer: COMMERCIAL

## 2024-06-10 DIAGNOSIS — N64.89 BREAST ASYMMETRY: ICD-10-CM

## 2024-06-10 PROCEDURE — 77065 DX MAMMO INCL CAD UNI: CPT | Mod: RT

## 2024-07-14 ENCOUNTER — HEALTH MAINTENANCE LETTER (OUTPATIENT)
Age: 54
End: 2024-07-14

## 2024-08-19 ENCOUNTER — ONCOLOGY VISIT (OUTPATIENT)
Dept: ONCOLOGY | Facility: HOSPITAL | Age: 54
End: 2024-08-19
Payer: COMMERCIAL

## 2024-08-19 ENCOUNTER — PATIENT OUTREACH (OUTPATIENT)
Dept: ONCOLOGY | Facility: HOSPITAL | Age: 54
End: 2024-08-19
Payer: COMMERCIAL

## 2024-08-19 VITALS
RESPIRATION RATE: 16 BRPM | HEART RATE: 64 BPM | WEIGHT: 232 LBS | OXYGEN SATURATION: 96 % | BODY MASS INDEX: 38.65 KG/M2 | SYSTOLIC BLOOD PRESSURE: 141 MMHG | TEMPERATURE: 98.3 F | DIASTOLIC BLOOD PRESSURE: 77 MMHG | HEIGHT: 65 IN

## 2024-08-19 DIAGNOSIS — Z17.0 MALIGNANT NEOPLASM OF UPPER-OUTER QUADRANT OF LEFT BREAST IN FEMALE, ESTROGEN RECEPTOR POSITIVE (H): Primary | ICD-10-CM

## 2024-08-19 DIAGNOSIS — C50.412 MALIGNANT NEOPLASM OF UPPER-OUTER QUADRANT OF LEFT BREAST IN FEMALE, ESTROGEN RECEPTOR POSITIVE (H): Primary | ICD-10-CM

## 2024-08-19 PROCEDURE — 99214 OFFICE O/P EST MOD 30 MIN: CPT | Performed by: INTERNAL MEDICINE

## 2024-08-19 ASSESSMENT — PAIN SCALES - GENERAL: PAINLEVEL: NO PAIN (0)

## 2024-08-19 NOTE — PROGRESS NOTES
"[facility]: Cancer Care                                                                                            Situation: Patient chart reviewed by care coordinator.    Background: Patient with a diagnosis of stage II left breast cancer, T2 N1 M0, lobular carcinoma.  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  Subsequent to that she has been on aromatase inhibitor therapy from February 2008 until July 2015.  Currently on observation.    Assessment: Patient was in today for follow-up with Dr Ramos.    Plan/Recommendations: He recommends that she continue yearly follow-up with our office.  I have \"resolved\" her from cancer care coordination and \"graduated\" her from enrollment.    Signature:  Bronwyn Mejia RN    "

## 2024-08-19 NOTE — LETTER
"8/19/2024      Nichol Gee  [address]  [place] [address]      Dear Colleague,    Thank you for referring your patient, Nichol Gee, to the [facility]. Please see a copy of my visit note below.    Oncology Rooming Note    August 19, 2024 3:09 PM   Nichol Gee is a 53 year old female who presents for:    Chief Complaint   Patient presents with     Oncology Clinic Visit     Return visit 1 year. Mammogram routine 06/03/24/ and diagnostic 06/10/24. Malignant neoplasm of upper-outer quadrant of left breast in female, estrogen receptor positive.     Initial Vitals: BP (!) 141/77 (BP Location: Right arm, Patient Position: Sitting, Cuff Size: Adult Large)   Pulse 64   Temp 98.3  F (36.8  C) (Oral)   Resp 16   Ht 1.638 m (5' 4.5\")   Wt 105.2 kg (232 lb)   LMP  (LMP Unknown)   SpO2 96%   BMI 39.21 kg/m   Estimated body mass index is 39.21 kg/m  as calculated from the following:    Height as of this encounter: 1.638 m (5' 4.5\").    Weight as of this encounter: 105.2 kg (232 lb). Body surface area is 2.19 meters squared.  No Pain (0) Comment: Data Unavailable   No LMP recorded (lmp unknown). Patient has had a hysterectomy.  Allergies reviewed: Yes  Medications reviewed: Yes    Medications: Medication refills not needed today.  Pharmacy name entered into [facility]:    [facility][id number] - [place], [place] - [address] [facility] AT [facility] & [address] [place] [address]/[facility]ARMACY #[id number] - [place], [place] - [address] [place] [address] AT [address]    Frailty Screening:   Is the patient here for a new oncology consult visit in cancer care? 2. No      Clinical concerns: none       Sharon Grover, [facility] cancer Care Progress Note    Patient: Nichol Gee  MRN: [id number]   Date of Service: Aug 19, 2024         Reason for visit      1. Malignant neoplasm of upper-outer quadrant of left breast in female, estrogen receptor positive (H)  "       Assessment     1.         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   Subsequent to that, she has been on aromatase inhibitor therapy from 02/2008 to July 2015.  Currently on observation. Slight fibronudularity seen on her mammogram.  Last mammogram in June 2024 was normal.  2.         Poor CYP2D6 metabolism.  3.         Normal bone density in May 2023.  4.         Health maintenance issues.  She has some hypertension, elevated blood sugar levels, borderline elevated hemoglobin A1c as well as poor lipid panel.  She is working to get her hemoglobin A1c down.  Is down to 6.2.    Plan     1.  Continue with active surveillance.  Yearly mammogram and checkup.  2.  We did talk about her elevated blood sugar level and slightly high body mass index which puts her at high risk of medical complications.  She is working very hard to get him down.  3.  Continue good diet and proper exercise.  4.  Annual mammogram.  5.  Next bone density in 2025.        Clinical stage      Infiltrating Lobular Carcinoma Of The Breast Mixed With Other Types Of Carcinoma    Primary site: Breast (Left)    Clinical free text: ER+,AR+,Oby9hfs positive by FISH    Clinical: Stage IIIA (T3, N1, cM0) signed by Jersey Ramos MD on 7/13/2015 11:05 AM    Pathologic: Stage IIIA (T3, N1a, cM0) signed by Jersey Ramos MD on 7/13/2015 11:05 AM    Summary: Stage IIIA (T3, N1a, cM0)      History     Nichol Gee is a very pleasant 53 year old  female with a history of breast cancer diagnosed in June 2008, premenopausal at the time of diagnosis, located on the left side, presenting as a lump, measuring 5.2 cm in size, 1 positive lymph node, ER/AR positive, HER-2/gustavo 2+ 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  Subsequent to  "that, she was treated with Taxotere, carboplatin and Herceptin for 6 cycles and subsequent to that, she was put on hormone therapy with tamoxifen but was found to have very poor CYP2D6 metabolism so she was started on aromatase inhibitor after being on Lupron therapy.  Subsequent to that, she underwent a total abdominal hysterectomy in  and was on aromatase inhibitors with anastrozole from 2008 to 2015.  She stopped after that.    She is also known to have osteoporosis and she is unable to tolerate oral bisphosphonates.  She got once a year Zometa. Bone density in  was normal. So Zometa was stopped in .    On the positive side her younger daughter has now gone to college.  She is happy about that.    Her mother passed away in May 2022 from dementia associated complications.    Has been started on small dose of HCTZ for blood pressure control.    As further review of family history is concerned sister  from some heart issues in 2023.    Comes in today for scheduled follow-up.  Overall seems to be doing okay.  Had mammogram in 2024.  Had last bone density in May 2023 which was normal.    Past Medical History     Past Medical History:   Diagnosis Date     Breast cancer (H)     11 years     Breast cyst      Hx antineoplastic chemotherapy      Hx of radiation therapy        Review of system      Details noted in the history of present illness.  A detailed review of systems is otherwise negative.      Physical exam        BP (!) 141/77 (BP Location: Right arm, Patient Position: Sitting, Cuff Size: Adult Large)   Pulse 64   Temp 98.3  F (36.8  C) (Oral)   Resp 16   Ht 1.638 m (5' 4.5\")   Wt 105.2 kg (232 lb)   LMP  (LMP Unknown)   SpO2 96%   BMI 39.21 kg/m      GENERAL: No acute distress. Cooperative in conversation.   HEENT:  Pupils are equal, round and reactive. Oral mucosa is clean and intact. No ulcerations or mucositis noted. No bleeding noted.  RESP:Chest symmetric lungs are " clear bilaterally per auscultation. Regular respiratory rate. No wheezes or rhonchi.  CV: Normal S1 S2 Regular, rate and rhythm.     ABD: Nondistended, soft, nontender. Positive bowel sounds. No organomegaly.   EXTREMITIES: No lower extremity edema.   NEURO: Non- focal. Alert and oriented x3.  Cranial nerves appear intact.  PSYCH: Within normal limits. No depression or anxiety.  SKIN: Warm dry intact.  BREAST: Right breast is normal.  No palpable lump.  No nipple discharge.  Left breast status postmastectomy.  No chest wall evidence of any recurrence.  Bilateral axillary examination is negative.  She appears to have some cording in her left axillary area.  Advised to do some stretching etc.    Lab results Reviewed      No results found for this or any previous visit (from the past 168 hour(s)).    Imaging results Reviewed        No results found.      Signed by: Jersey Ramos MD      This note has been dictated using voice recognition software. Any grammatical or context distortions are unintentional and inherent to the software      Again, thank you for allowing me to participate in the care of your patient.        Sincerely,        Jersey Ramos MD

## 2024-08-19 NOTE — PROGRESS NOTES
"Oncology Rooming Note    August 19, 2024 3:09 PM   Nichol Gee is a 53 year old female who presents for:    Chief Complaint   Patient presents with    Oncology Clinic Visit     Return visit 1 year. Mammogram routine 06/03/24/ and diagnostic 06/10/24. Malignant neoplasm of upper-outer quadrant of left breast in female, estrogen receptor positive.     Initial Vitals: BP (!) 141/77 (BP Location: Right arm, Patient Position: Sitting, Cuff Size: Adult Large)   Pulse 64   Temp 98.3  F (36.8  C) (Oral)   Resp 16   Ht 1.638 m (5' 4.5\")   Wt 105.2 kg (232 lb)   LMP  (LMP Unknown)   SpO2 96%   BMI 39.21 kg/m   Estimated body mass index is 39.21 kg/m  as calculated from the following:    Height as of this encounter: 1.638 m (5' 4.5\").    Weight as of this encounter: 105.2 kg (232 lb). Body surface area is 2.19 meters squared.  No Pain (0) Comment: Data Unavailable   No LMP recorded (lmp unknown). Patient has had a hysterectomy.  Allergies reviewed: Yes  Medications reviewed: Yes    Medications: Medication refills not needed today.  Pharmacy name entered into [employer]:    [facility] #[id number] - [place], [place] - [address] [facility] AT [address] & [address]ARMACY #[id number] - [place], [place] - [address] [place] [address] AT [address]    Frailty Screening:   Is the patient here for a new oncology consult visit in cancer care? 2. No      Clinical concerns: none       Sharon Grover CMA            "

## 2024-08-20 NOTE — PROGRESS NOTES
[facility] cancer Care Progress Note    Patient: Nichol Gee  MRN: [id number]   Date of Service: Aug 19, 2024         Reason for visit      1. Malignant neoplasm of upper-outer quadrant of left breast in female, estrogen receptor positive (H)        Assessment     1.         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   Subsequent to that, she has been on aromatase inhibitor therapy from 02/2008 to July 2015.  Currently on observation. Slight fibronudularity seen on her mammogram.  Last mammogram in June 2024 was normal.  2.         Poor CYP2D6 metabolism.  3.         Normal bone density in May 2023.  4.         Health maintenance issues.  She has some hypertension, elevated blood sugar levels, borderline elevated hemoglobin A1c as well as poor lipid panel.  She is working to get her hemoglobin A1c down.  Is down to 6.2.    Plan     1.  Continue with active surveillance.  Yearly mammogram and checkup.  2.  We did talk about her elevated blood sugar level and slightly high body mass index which puts her at high risk of medical complications.  She is working very hard to get him down.  3.  Continue good diet and proper exercise.  4.  Annual mammogram.  5.  Next bone density in 2025.        Clinical stage      Infiltrating Lobular Carcinoma Of The Breast Mixed With Other Types Of Carcinoma    Primary site: Breast (Left)    Clinical free text: ER+,NJ+,Tkg4kjx positive by FISH    Clinical: Stage IIIA (T3, N1, cM0) signed by Jersey Ramos MD on 7/13/2015 11:05 AM    Pathologic: Stage IIIA (T3, N1a, cM0) signed by Jersey Ramos MD on 7/13/2015 11:05 AM    Summary: Stage IIIA (T3, N1a, cM0)      History     Nichol Gee is a very pleasant 53 year old  female with a  "history of breast cancer diagnosed in 2008, premenopausal at the time of diagnosis, located on the left side, presenting as a lump, measuring 5.2 cm in size, 1 positive lymph node, ER/MI positive, HER-2/gustavo 2+ 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  Subsequent to that, she was treated with Taxotere, carboplatin and Herceptin for 6 cycles and subsequent to that, she was put on hormone therapy with tamoxifen but was found to have very poor CYP2D6 metabolism so she was started on aromatase inhibitor after being on Lupron therapy.  Subsequent to that, she underwent a total abdominal hysterectomy in  and was on aromatase inhibitors with anastrozole from 2008 to 2015.  She stopped after that.    She is also known to have osteoporosis and she is unable to tolerate oral bisphosphonates.  She got once a year Zometa. Bone density in  was normal. So Zometa was stopped in .    On the positive side her younger daughter has now gone to college.  She is happy about that.    Her mother passed away in May 2022 from dementia associated complications.    Has been started on small dose of HCTZ for blood pressure control.    As further review of family history is concerned sister  from some heart issues in 2023.    Comes in today for scheduled follow-up.  Overall seems to be doing okay.  Had mammogram in 2024.  Had last bone density in May 2023 which was normal.    Past Medical History     Past Medical History:   Diagnosis Date    Breast cancer (H)     11 years    Breast cyst     Hx antineoplastic chemotherapy     Hx of radiation therapy        Review of system      Details noted in the history of present illness.  A detailed review of systems is otherwise negative.      Physical exam        BP (!) 141/77 (BP Location: Right arm, Patient Position: Sitting, Cuff Size: Adult Large)   Pulse 64   Temp 98.3  F (36.8  C) (Oral)   Resp 16   Ht 1.638 m (5' 4.5\")   Wt 105.2 kg (232 lb)   LMP  (LMP Unknown)  "  SpO2 96%   BMI 39.21 kg/m      GENERAL: No acute distress. Cooperative in conversation.   HEENT:  Pupils are equal, round and reactive. Oral mucosa is clean and intact. No ulcerations or mucositis noted. No bleeding noted.  RESP:Chest symmetric lungs are clear bilaterally per auscultation. Regular respiratory rate. No wheezes or rhonchi.  CV: Normal S1 S2 Regular, rate and rhythm.     ABD: Nondistended, soft, nontender. Positive bowel sounds. No organomegaly.   EXTREMITIES: No lower extremity edema.   NEURO: Non- focal. Alert and oriented x3.  Cranial nerves appear intact.  PSYCH: Within normal limits. No depression or anxiety.  SKIN: Warm dry intact.  BREAST: Right breast is normal.  No palpable lump.  No nipple discharge.  Left breast status postmastectomy.  No chest wall evidence of any recurrence.  Bilateral axillary examination is negative.  She appears to have some cording in her left axillary area.  Advised to do some stretching etc.    Lab results Reviewed      No results found for this or any previous visit (from the past 168 hour(s)).    Imaging results Reviewed        No results found.      Signed by: Jersey Ramos MD      This note has been dictated using voice recognition software. Any grammatical or context distortions are unintentional and inherent to the software

## 2024-09-06 ENCOUNTER — OFFICE VISIT (OUTPATIENT)
Dept: FAMILY MEDICINE | Facility: CLINIC | Age: 54
End: 2024-09-06
Payer: COMMERCIAL

## 2024-09-06 ENCOUNTER — ANCILLARY PROCEDURE (OUTPATIENT)
Dept: GENERAL RADIOLOGY | Facility: CLINIC | Age: 54
End: 2024-09-06
Attending: FAMILY MEDICINE
Payer: COMMERCIAL

## 2024-09-06 VITALS
SYSTOLIC BLOOD PRESSURE: 132 MMHG | OXYGEN SATURATION: 95 % | WEIGHT: 230.8 LBS | TEMPERATURE: 97.8 F | BODY MASS INDEX: 38.45 KG/M2 | DIASTOLIC BLOOD PRESSURE: 86 MMHG | RESPIRATION RATE: 16 BRPM | HEIGHT: 65 IN | HEART RATE: 80 BPM

## 2024-09-06 DIAGNOSIS — Z00.00 ROUTINE GENERAL MEDICAL EXAMINATION AT A HEALTH CARE FACILITY: Primary | ICD-10-CM

## 2024-09-06 DIAGNOSIS — E11.9 CONTROLLED TYPE 2 DIABETES MELLITUS WITHOUT COMPLICATION, WITHOUT LONG-TERM CURRENT USE OF INSULIN (H): ICD-10-CM

## 2024-09-06 DIAGNOSIS — E66.01 MORBID OBESITY (H): ICD-10-CM

## 2024-09-06 DIAGNOSIS — L30.9 DERMATITIS: ICD-10-CM

## 2024-09-06 DIAGNOSIS — L71.9 ROSACEA: ICD-10-CM

## 2024-09-06 DIAGNOSIS — M25.512 ACUTE PAIN OF LEFT SHOULDER: ICD-10-CM

## 2024-09-06 DIAGNOSIS — I10 BENIGN ESSENTIAL HYPERTENSION: ICD-10-CM

## 2024-09-06 LAB
ALBUMIN SERPL BCG-MCNC: 4.5 G/DL (ref 3.5–5.2)
ALP SERPL-CCNC: 126 U/L (ref 40–150)
ALT SERPL W P-5'-P-CCNC: 16 U/L (ref 0–50)
ANION GAP SERPL CALCULATED.3IONS-SCNC: 15 MMOL/L (ref 7–15)
AST SERPL W P-5'-P-CCNC: 23 U/L (ref 0–45)
BILIRUB SERPL-MCNC: 0.3 MG/DL
BUN SERPL-MCNC: 14.4 MG/DL (ref 6–20)
CALCIUM SERPL-MCNC: 9.9 MG/DL (ref 8.8–10.4)
CHLORIDE SERPL-SCNC: 100 MMOL/L (ref 98–107)
CHOLEST SERPL-MCNC: 211 MG/DL
CREAT SERPL-MCNC: 0.98 MG/DL (ref 0.51–0.95)
EGFRCR SERPLBLD CKD-EPI 2021: 68 ML/MIN/1.73M2
FASTING STATUS PATIENT QL REPORTED: YES
FASTING STATUS PATIENT QL REPORTED: YES
GLUCOSE SERPL-MCNC: 127 MG/DL (ref 70–99)
HBA1C MFR BLD: 6.4 % (ref 0–5.6)
HCO3 SERPL-SCNC: 26 MMOL/L (ref 22–29)
HDLC SERPL-MCNC: 30 MG/DL
LDLC SERPL CALC-MCNC: 129 MG/DL
NONHDLC SERPL-MCNC: 181 MG/DL
POTASSIUM SERPL-SCNC: 4 MMOL/L (ref 3.4–5.3)
PROT SERPL-MCNC: 7.8 G/DL (ref 6.4–8.3)
SODIUM SERPL-SCNC: 141 MMOL/L (ref 135–145)
TRIGL SERPL-MCNC: 262 MG/DL

## 2024-09-06 PROCEDURE — 83036 HEMOGLOBIN GLYCOSYLATED A1C: CPT | Performed by: FAMILY MEDICINE

## 2024-09-06 PROCEDURE — 73030 X-RAY EXAM OF SHOULDER: CPT | Mod: TC | Performed by: RADIOLOGY

## 2024-09-06 PROCEDURE — 99213 OFFICE O/P EST LOW 20 MIN: CPT | Mod: 25 | Performed by: FAMILY MEDICINE

## 2024-09-06 PROCEDURE — 80061 LIPID PANEL: CPT | Performed by: FAMILY MEDICINE

## 2024-09-06 PROCEDURE — 99396 PREV VISIT EST AGE 40-64: CPT | Performed by: FAMILY MEDICINE

## 2024-09-06 PROCEDURE — 80053 COMPREHEN METABOLIC PANEL: CPT | Performed by: FAMILY MEDICINE

## 2024-09-06 PROCEDURE — 36415 COLL VENOUS BLD VENIPUNCTURE: CPT | Performed by: FAMILY MEDICINE

## 2024-09-06 RX ORDER — TRIAMCINOLONE ACETONIDE 1 MG/G
CREAM TOPICAL 2 TIMES DAILY
Qty: 45 G | Refills: 1 | Status: SHIPPED | OUTPATIENT
Start: 2024-09-06

## 2024-09-06 RX ORDER — METRONIDAZOLE 7.5 MG/G
LOTION TOPICAL
Qty: 59 ML | Refills: 1 | Status: SHIPPED | OUTPATIENT
Start: 2024-09-06

## 2024-09-06 RX ORDER — HYDROCHLOROTHIAZIDE 12.5 MG/1
12.5 TABLET ORAL DAILY
Qty: 90 TABLET | Refills: 3 | Status: SHIPPED | OUTPATIENT
Start: 2024-09-06

## 2024-09-06 RX ORDER — LANCETS
EACH MISCELLANEOUS
Qty: 100 EACH | Refills: 6 | Status: SHIPPED | OUTPATIENT
Start: 2024-09-06

## 2024-09-06 ASSESSMENT — PAIN SCALES - GENERAL: PAINLEVEL: NO PAIN (0)

## 2024-09-06 NOTE — PATIENT INSTRUCTIONS
Patient Education   Preventive Care Advice   This is general advice given by our system to help you stay healthy. However, your care team may have specific advice just for you. Please talk to your care team about your preventive care needs.  Nutrition  Eat 5 or more servings of fruits and vegetables each day.  Try wheat bread, brown rice and whole grain pasta (instead of white bread, rice, and pasta).  Get enough calcium and vitamin D. Check the label on foods and aim for 100% of the RDA (recommended daily allowance).  Lifestyle  Exercise at least 150 minutes each week  (30 minutes a day, 5 days a week).  Do muscle strengthening activities 2 days a week. These help control your weight and prevent disease.  No smoking.  Wear sunscreen to prevent skin cancer.  Have a dental exam and cleaning every 6 months.  Yearly exams  See your health care team every year to talk about:  Any changes in your health.  Any medicines your care team has prescribed.  Preventive care, family planning, and ways to prevent chronic diseases.  Shots (vaccines)   HPV shots (up to age 26), if you've never had them before.  Hepatitis B shots (up to age 59), if you've never had them before.  COVID-19 shot: Get this shot when it's due.  Flu shot: Get a flu shot every year.  Tetanus shot: Get a tetanus shot every 10 years.  Pneumococcal, hepatitis A, and RSV shots: Ask your care team if you need these based on your risk.  Shingles shot (for age 50 and up)  General health tests  Diabetes screening:  Starting at age 35, Get screened for diabetes at least every 3 years.  If you are younger than age 35, ask your care team if you should be screened for diabetes.  Cholesterol test: At age 39, start having a cholesterol test every 5 years, or more often if advised.  Bone density scan (DEXA): At age 50, ask your care team if you should have this scan for osteoporosis (brittle bones).  Hepatitis C: Get tested at least once in your life.  STIs (sexually  transmitted infections)  Before age 24: Ask your care team if you should be screened for STIs.  After age 24: Get screened for STIs if you're at risk. You are at risk for STIs (including HIV) if:  You are sexually active with more than one person.  You don't use condoms every time.  You or a partner was diagnosed with a sexually transmitted infection.  If you are at risk for HIV, ask about PrEP medicine to prevent HIV.  Get tested for HIV at least once in your life, whether you are at risk for HIV or not.  Cancer screening tests  Cervical cancer screening: If you have a cervix, begin getting regular cervical cancer screening tests starting at age 21.  Breast cancer scan (mammogram): If you've ever had breasts, begin having regular mammograms starting at age 40. This is a scan to check for breast cancer.  Colon cancer screening: It is important to start screening for colon cancer at age 45.  Have a colonoscopy test every 10 years (or more often if you're at risk) Or, ask your provider about stool tests like a FIT test every year or Cologuard test every 3 years.  To learn more about your testing options, visit:   .  For help making a decision, visit:   https://bit.ly/az95819.  Prostate cancer screening test: If you have a prostate, ask your care team if a prostate cancer screening test (PSA) at age 55 is right for you.  Lung cancer screening: If you are a current or former smoker ages 50 to 80, ask your care team if ongoing lung cancer screenings are right for you.  For informational purposes only. Not to replace the advice of your health care provider. Copyright   2023 [employer]. All rights reserved. Clinically reviewed by the [facility] Transitions Program. [employer] [id number] - REV 01/24.

## 2024-09-06 NOTE — PROGRESS NOTES
Preventive Care Visit  [facility] SILVIA Jane MD, Family Medicine  Sep 6, 2024      Assessment & Plan     Routine general medical examination at a health care facility    Benign essential hypertension  Blood pressure at goal today.  Hydrochlorothiazide refilled.  - Comprehensive metabolic panel (BMP + Alb, Alk Phos, ALT, AST, Total. Bili, TP); Future  - Comprehensive metabolic panel (BMP + Alb, Alk Phos, ALT, AST, Total. Bili, TP)  - hydroCHLOROthiazide 12.5 MG tablet; Take 1 tablet (12.5 mg) by mouth daily.    Controlled type 2 diabetes mellitus without complication, without long-term current use of insulin (H)  Diagnosis of diabetes with an A1c of 6.5 about a year ago.  She has cut back on soda which has been helpful.  A1c today is 6.4.  - Hemoglobin A1c; Future  - Lipid panel reflex to direct LDL Fasting; Future  - Hemoglobin A1c  - Lipid panel reflex to direct LDL Fasting  - blood glucose (NO BRAND SPECIFIED) test strip; Use to test blood sugar 1 times daily or as directed. To accompany: Blood Glucose Monitor Brands: per insurance.  - thin (NO BRAND SPECIFIED) lancets; Use with lanceting device. To accompany: Blood Glucose Monitor Brands: per insurance.    Acute pain of left shoulder  X-ray of shoulder today is overall normal.  Will have her see physical therapy.    Dermatitis  Refill  - oxiconazole nitrate (OXISTAT) 1 % external lotion; Use to toe twice daily PRN  - triamcinolone (KENALOG) 0.1 % external cream; Apply topically 2 times daily.  - metroNIDAZOLE (METROLOTION) 0.75 % external lotion; Apply to affected area on face two times daily    Rosacea  refill  - metroNIDAZOLE (METROLOTION) 0.75 % external lotion; Apply to affected area on face two times daily    Morbid obesity  Discussed staying active.  Discussed healthful eating.    Patient has been advised of split billing requirements and indicates understanding: Yes        BMI  Estimated body mass index is 39 kg/m  as  "calculated from the following:    Height as of this encounter: 1.638 m (5' 4.5\").    Weight as of this encounter: 104.7 kg (230 lb 12.8 oz).   Weight management plan: Discussed healthy diet and exercise guidelines    Counseling  Appropriate preventive services were addressed with this patient via screening, questionnaire, or discussion as appropriate for fall prevention, nutrition, physical activity, Tobacco-use cessation, social engagement, weight loss and cognition.  Checklist reviewing preventive services available has been given to the patient.  Reviewed patient's diet, addressing concerns and/or questions.   She is at risk for lack of exercise and has been provided with information to increase physical activity for the benefit of her well-being.           Aurea Gandara is a 53 year old, presenting for the following:  Physical        9/6/2024    10:07 AM   Additional Questions   [facility]        Health Care Directive  Patient does not have a Health Care Directive or Living Will: Discussed advance care planning with patient; information given to patient to review.    History of Present Illness       Reason for visit:  Lab work, shoulder popping    She eats 2-3 servings of fruits and vegetables daily.She consumes 1 sweetened beverage(s) daily.She exercises with enough effort to increase her heart rate 9 or less minutes per day.  She exercises with enough effort to increase her heart rate 3 or less days per week. She is missing 2 dose(s) of medications per week.  She is not taking prescribed medications regularly due to remembering to take.    Left shoulder has been popping per her report.  She notes that this started about 3 months ago after she lifted something somewhat heavy.  She notes that when she reaches up and behind her head this is when she will have the popping noise.  Sometimes it is painful and sometimes it is not.  Otherwise, her left arm is a bit weak due to history of breast " cancer and surgery on that side but that has been stable.              9/6/2024   General Health   How would you rate your overall physical health? Good   Feel stress (tense, anxious, or unable to sleep) To some extent      (!) STRESS CONCERN      9/6/2024   Nutrition   Three or more servings of calcium each day? Yes   Diet: Regular (no restrictions)   How many servings of fruit and vegetables per day? (!) 2-3   How many sweetened beverages each day? 0-1            9/6/2024   Exercise   Days per week of moderate/strenous exercise 1 day      (!) EXERCISE CONCERN      9/6/2024   Social Factors   Frequency of gathering with friends or relatives Twice a week   Worry food won't last until get money to buy more No   Food not last or not have enough money for food? No   Do you have housing? (Housing is defined as stable permanent housing and does not include staying ouside in a car, in a tent, in an abandoned building, in an overnight shelter, or couch-surfing.) Yes   Are you worried about losing your housing? No   Lack of transportation? No   Unable to get utilities (heat,electricity)? No            9/6/2024   Fall Risk   Fallen 2 or more times in the past year? No   Trouble with walking or balance? No             9/6/2024   Dental   Dentist two times every year? Yes            9/6/2024   TB Screening   Were you born outside of the [place]? Yes            Today's PHQ-2 Score:       9/6/2024    10:02 AM   PHQ-2 ( 1999 [employer])   Q1: Little interest or pleasure in doing things 0   Q2: Feeling down, depressed or hopeless 0   PHQ-2 Score 0   Q1: Little interest or pleasure in doing things Not at all   Q2: Feeling down, depressed or hopeless Not at all   PHQ-2 Score 0           9/6/2024   Substance Use   Alcohol more than 3/day or more than 7/wk No   Do you use any other substances recreationally? No        Social History     Tobacco Use    Smoking status: Never    Smokeless tobacco: Never           6/3/2024   LAST FHS-7 RESULTS    1st degree relative breast or ovarian cancer Yes   Any relative bilateral breast cancer No   Any male have breast cancer No   Any ONE woman have BOTH breast AND ovarian cancer No   Any woman with breast cancer before 50yrs No   2 or more relatives with breast AND/OR ovarian cancer No   2 or more relatives with breast AND/OR bowel cancer No           Mammogram Screening - Mammogram every 1-2 years updated in Health Maintenance based on mutual decision making        9/6/2024   STI Screening   New sexual partner(s) since last STI/HIV test? No        History of abnormal Pap smear: No - age 30- 64 PAP with HPV every 5 years recommended       ASCVD Risk   The 10-year ASCVD risk score (Donna RICHARD, et al., 2019) is: 10.4%    Values used to calculate the score:      Age: 53 years      Sex: Female      Is Non- : No      Diabetic: Yes      Tobacco smoker: No      Systolic Blood Pressure: 141 mmHg      Is BP treated: Yes      HDL Cholesterol: 29 mg/dL      Total Cholesterol: 198 mg/dL           Reviewed and updated as needed this visit by Provider                    Past Medical History:   Diagnosis Date    Breast cancer (H)     11 years    Breast cyst     Hx antineoplastic chemotherapy     Hx of radiation therapy      Past Surgical History:   Procedure Laterality Date    BIOPSY BREAST      HC REMOVAL OF TONSILS,<10 Y/O      Description: Tonsillectomy;  Recorded: 06/13/2007;    HYSTERECTOMY      HYSTERECTOMY TOTAL ABDOMINAL  2009    MASTECTOMY Left     OOPHORECTOMY      LA MASTECTOMY, MODIFIED RADICAL      Description: Modified Radical Mastectomy Left Breast;  Proc Date: 06/28/2007;  Comments: with complete lymph node disection.    LA VAGINAL HYSTERECTOMY,UTERUS 250 GMS/<      Description: Vaginal Hysterectomy;  Recorded: 10/29/2009;  Comments: LAVH with BSO.  Cervix removed.         Review of Systems  Constitutional, HEENT, cardiovascular, pulmonary, GI, , musculoskeletal, neuro, skin,  "endocrine and psych systems are negative, except as otherwise noted.     Objective    Exam  LMP  (LMP Unknown)    Estimated body mass index is 39.21 kg/m  as calculated from the following:    Height as of 8/19/24: 1.638 m (5' 4.5\").    Weight as of 8/19/24: 105.2 kg (232 lb).    Physical Exam    Physical Exam:  General Appearance: Alert, cooperative, no distress, appears stated age   Head: Normocephalic, without obvious abnormality, atraumatic  Eyes: PERRL, conjunctiva/corneas clear, EOM's intact   Ears: Normal TM's and external ear canals, both ears  Nose:Nares normal, septum midline,mucosa normal, no drainage    Throat:Lips, mucosa, and tongue normal; teeth and gums normal  Neck: Supple, symmetrical, trachea midline, no adenopathy;  thyroid: not enlarged, symmetric, no tenderness/mass/nodules  Back: Symmetric, no curvature, ROM normal,  Lungs: Clear to auscultation bilaterally, respirations unlabored  Breasts: No breast masses, tenderness, or nipple discharge on right, left breast is surgically absent.  Heart: Regular rate and rhythm, S1 and S2 normal, no murmur, rub, or gallop  Abdomen: Soft, non-tender, bowel sounds active all four quadrants,  no masses, no organomegaly  Extremities: Extremities normal, atraumatic, no cyanosis or edema  Skin: Skin color, texture, turgor normal, no rashes or lesions  Lymph nodes: Cervical, supraclavicular, and axillary nodes normal and   Neurologic: Normal  Musculoskeletal: No tenderness palpation over left shoulder with normal range of motion.  When arm is extended above her head there is a distinct click in her left shoulder which was not painful today.        Signed Electronically by: Mitzi Jane MD    "

## 2024-09-10 ENCOUNTER — THERAPY VISIT (OUTPATIENT)
Dept: PHYSICAL THERAPY | Facility: CLINIC | Age: 54
End: 2024-09-10
Attending: FAMILY MEDICINE
Payer: COMMERCIAL

## 2024-09-10 DIAGNOSIS — M25.512 ACUTE PAIN OF LEFT SHOULDER: ICD-10-CM

## 2024-09-10 PROCEDURE — 97530 THERAPEUTIC ACTIVITIES: CPT | Mod: GP | Performed by: PHYSICAL THERAPIST

## 2024-09-10 PROCEDURE — 97110 THERAPEUTIC EXERCISES: CPT | Mod: GP | Performed by: PHYSICAL THERAPIST

## 2024-09-10 PROCEDURE — 97161 PT EVAL LOW COMPLEX 20 MIN: CPT | Mod: GP | Performed by: PHYSICAL THERAPIST

## 2024-09-10 ASSESSMENT — ACTIVITIES OF DAILY LIVING (ADL)
WASHING_YOUR_HAIR?: 0
REMOVING_SOMETHING_FROM_YOUR_BACK_POCKET: 0
WASHING_YOUR_BACK: 1
PUTTING_ON_AN_UNDERSHIRT_OR_A_PULLOVER_SWEATER: 0
REACHING_FOR_SOMETHING_ON_A_HIGH_SHELF: 2
PUTTING_ON_A_SHIRT_THAT_BUTTONS_DOWN_THE_FRONT: 0
PLACING_AN_OBJECT_ON_A_HIGH_SHELF: 1
PUSHING_WITH_THE_INVOLVED_ARM: 0
AT_ITS_WORST?: 1
WHEN_LYING_ON_THE_INVOLVED_SIDE: 0
TOUCHING_THE_BACK_OF_YOUR_NECK: 1
PUTTING_ON_YOUR_PANTS: 0
PLEASE_INDICATE_YOR_PRIMARY_REASON_FOR_REFERRAL_TO_THERAPY:: SHOULDER

## 2024-09-10 NOTE — PROGRESS NOTES
PHYSICAL THERAPY EVALUATION  Type of Visit: Evaluation    See electronic medical record for Abuse and Falls Screening details.            Subjective     REFERRAL DX:  8/6/2024  Acute pain of left shoulder [M25.512]          SUBJECTIVE HISTORY: RHD 53 year old female with hx of L shoulder pain. Started about 3 months ago after she lifted something some what heavy (prone cylinder). Doesn't remember popping at time of event or any acute immediate pain. When she reaches overhead, she notices some popping noises. Sometimes painful, sometimes not. No tingling/numbness with popping/clicking sensations. Denies any previous injury or dislocations. Does have a hx of weakness in the L UE due to hx of breast CA and surgery.     PAIN:  Frequency: with movements/activity   Quality: clicking/popping  Progression:  plateau  Exacerbated by: reaching back/behind her head, reaching across the body, overhead  Eased by: avoiding those positions       Objective         Presenting condition or subjective complaint: Shoulder clicking/popping  Date of onset: 08/06/24    Relevant medical history: Cancer; Diabetes; High blood pressure; Overweight   Dates & types of surgery: Cancer    Prior diagnostic imaging/testing results: X-ray       IMPRESSION: No fracture or dislocation. Mild degenerative changes at  the glenohumeral joint. Small calcification along the posterior  lateral margin of the greater tuberosity of the humeral head.    Prior therapy history for the same diagnosis, illness or injury: No      Living Environment  Social support: With family members   Type of home: House   Stairs to enter the home: Yes   Is there a railing: Yes     Ramp: No   Stairs inside the home: Yes   Is there a railing: Yes     Help at home: None  Equipment owned:       Employment: No    Hobbies/Interests:      Patient goals for therapy: Hand behind my head with no popping/clicking    SHOULDER OBJECTIVE  ROM:   (Degrees) Left AROM Left PROM Right AROM  Right  "PROM   Shoulder Flexion 170, with audible \"pop\"  170    Shoulder Abduction 170  170    Shoulder Internal Rotation T8  T8    Shoulder External Rotation 70; at 90-90: 80  75, at 90-90: 100      STRENGTH:   Pain: - none + mild ++ moderate +++ severe  Strength Scale: 0-5/5 Left Right   Shoulder Flexion 5 5   Shoulder Abduction 5 5   Shoulder Internal Rotation 5 5   Shoulder External Rotation 4+ 4+     FLEXIBILITY: Decreased pectoralis major L, Decreased pectoralis minor L  SPECIAL TESTS:    Left   Impingement    Neer's Negative    Hawkin's-Moise Negative    Coracoid Impingement Negative    Internal impingement Negative    Labral    Anterior Slide Positive   Instability    Apprehension (anterior) Negative    Relocation (anterior) Negative    Anterior Load & Shift Negative    Posterior Load & Shift Negative    Posterior instability (with 90 degrees flex) Negative    Multi-Directional Instability     Sulcus Negative      PALPATION:  bicep tendon  JOINT MOBILITY:    Left   Glenohumeral anterior WNL   Glenohumeral posterior Hypomobile   Glenohumeral inferior Hypomobile       Assessment & Plan   CLINICAL IMPRESSIONS  Medical Diagnosis: L shoulder pain    Treatment Diagnosis: L shoulder pain   Impression/Assessment: Patient is a 53 year old female with L shoulder complaints.  The following significant findings have been identified: Decreased ROM/flexibility, Decreased joint mobility, and Decreased strength. These impairments interfere with their ability to perform self care tasks and household chores as compared to previous level of function.     Clinical Decision Making (Complexity):  Clinical Presentation: Stable/Uncomplicated  Clinical Presentation Rationale: based on medical and personal factors listed in PT evaluation  Clinical Decision Making (Complexity): Low complexity    PLAN OF CARE  Treatment Interventions:  Modalities: Cryotherapy, Dry Needling, E-stim, Hot Pack  Interventions: Gait Training, Manual Therapy, " Neuromuscular Re-education, Therapeutic Activity, Therapeutic Exercise, Self-Care/Home Management    Long Term Goals     PT Goal 1  Goal Description: Patient will demo ability to reach overhead with no pain with L arm  Rationale: to maximize safety and independence with performance of ADLs and functional tasks  Target Date: 12/03/24  PT Goal 2  Goal Description: Patient will report 50% reduciton in popping/clicking with L UE activities  Rationale: to maximize safety and independence with performance of ADLs and functional tasks  Target Date: 12/03/24      Frequency of Treatment: 1x/week - every other week  Duration of Treatment: 8-12 weeks    Education Assessment:   Learner/Method: Patient  Education Comments: PTRX    Risks and benefits of evaluation/treatment have been explained.   Patient/Family/caregiver agrees with Plan of Care.     Evaluation Time:     PT Eval, Low Complexity Minutes (89206): 15       Signing Clinician: Estephanie Carpenter PT

## 2024-09-12 ENCOUNTER — MYC MEDICAL ADVICE (OUTPATIENT)
Dept: FAMILY MEDICINE | Facility: CLINIC | Age: 54
End: 2024-09-12
Payer: COMMERCIAL

## 2024-09-12 DIAGNOSIS — E78.5 HYPERLIPIDEMIA LDL GOAL <130: Primary | ICD-10-CM

## 2024-09-13 RX ORDER — ATORVASTATIN CALCIUM 10 MG/1
10 TABLET, FILM COATED ORAL DAILY
Qty: 90 TABLET | Refills: 3 | Status: SHIPPED | OUTPATIENT
Start: 2024-09-13

## 2024-10-15 ENCOUNTER — IMMUNIZATION (OUTPATIENT)
Dept: FAMILY MEDICINE | Facility: CLINIC | Age: 54
End: 2024-10-15
Payer: COMMERCIAL

## 2024-10-15 PROCEDURE — 90673 RIV3 VACCINE NO PRESERV IM: CPT

## 2024-10-15 PROCEDURE — 90471 IMMUNIZATION ADMIN: CPT

## 2025-01-12 ENCOUNTER — HEALTH MAINTENANCE LETTER (OUTPATIENT)
Age: 55
End: 2025-01-12

## 2025-02-23 ENCOUNTER — MYC MEDICAL ADVICE (OUTPATIENT)
Dept: FAMILY MEDICINE | Facility: CLINIC | Age: 55
End: 2025-02-23
Payer: COMMERCIAL

## 2025-02-23 DIAGNOSIS — E11.9 CONTROLLED TYPE 2 DIABETES MELLITUS WITHOUT COMPLICATION, WITHOUT LONG-TERM CURRENT USE OF INSULIN (H): Primary | ICD-10-CM

## 2025-03-15 ENCOUNTER — HOSPITAL ENCOUNTER (OUTPATIENT)
Dept: GENERAL RADIOLOGY | Facility: HOSPITAL | Age: 55
Discharge: HOME | End: 2025-03-15
Payer: COMMERCIAL

## 2025-03-15 ENCOUNTER — OFFICE VISIT (OUTPATIENT)
Dept: URGENT CARE | Facility: URGENT CARE | Age: 55
End: 2025-03-15
Payer: COMMERCIAL

## 2025-03-15 VITALS
TEMPERATURE: 98 F | SYSTOLIC BLOOD PRESSURE: 141 MMHG | WEIGHT: 220 LBS | DIASTOLIC BLOOD PRESSURE: 79 MMHG | BODY MASS INDEX: 37.18 KG/M2 | RESPIRATION RATE: 17 BRPM | HEART RATE: 72 BPM | OXYGEN SATURATION: 95 %

## 2025-03-15 DIAGNOSIS — M79.672 LEFT FOOT PAIN: Primary | ICD-10-CM

## 2025-03-15 DIAGNOSIS — M79.672 LEFT FOOT PAIN: ICD-10-CM

## 2025-03-15 PROCEDURE — 99213 OFFICE O/P EST LOW 20 MIN: CPT

## 2025-03-15 PROCEDURE — 73630 X-RAY EXAM OF FOOT: CPT | Mod: LT

## 2025-03-15 PROCEDURE — 3077F SYST BP >= 140 MM HG: CPT

## 2025-03-15 PROCEDURE — 3078F DIAST BP <80 MM HG: CPT

## 2025-03-15 NOTE — PATIENT INSTRUCTIONS
Thank you for coming in to see us today!    - Take 600 mg (3 Advil) three times per day for the next 5 days  - Put ice or frozen vegetables on the foot for 10 minutes, 3 times per day  - Wear shoes or slippers at all times when walking around  - Follow up in 1 week if no improvement    Grzegorz Goldman, DO

## 2025-03-15 NOTE — PROGRESS NOTES
Assessment & Plan     Left foot pain  Nichol presents with left dorsal foot pain that has been present for about a week.  Worse when she walks barefoot.  Exam demonstrated tenderness over the proximal metatarsals/cuneiform/cuboid area.  Metatarsal squeeze negative.  Pain with dorsiflexion of toes against resistance.  Suspect soft tissue injury and/or bony contusion over that area.  X-ray obtained of the left foot, and on my review did not appear to demonstrate any fracture or bony abnormality.  Awaiting radiology read.  Encouraged her to take ibuprofen 600 mg 3 times daily for the next 5 days. Recommended icing and elevating the foot.  Encouraged her to wear shoes or her slippers around the house, as this gives her good relief.  - XR Foot Left G/E 3 Views  - Ibuprofen 600 3 times daily for 5 days    Grzegorz Goldman [facility]     Nichol is a 54 year old female who presents to clinic today for the following health issues:  Chief Complaint   Patient presents with    Urgent Care     - 1 Week  - Sore Foot (Left)  - Denies Injury  - Concerned of Sprain or Fx and would like to rule out today     HPI  Nichol presents with left foot pain that has been present for about 1 week. She denies any acute injury to the area, but states maybe her dog jumped on it when she was letting them out of the house. She states the pain is present when she is walking barefoot in the house. The pain is less when she is wearing her new slippers. She describes 8/10 pain last night while making dinner in her bare feet. Denies any numbness or tingling in the area.     ROS: 10 point ROS neg other than the symptoms noted above in the HPI.       Objective    BP (!) 141/79   Pulse 72   Temp 98  F (36.7  C) (Tympanic)   Resp 17   Wt 99.8 kg (220 lb)   LMP  (LMP Unknown)   SpO2 95%   BMI 37.18 kg/m    Physical Exam  Vitals reviewed.   Constitutional:       General: She is not in acute distress.      Appearance: Normal appearance. She is obese. She is not ill-appearing or toxic-appearing.   HENT:      Head: Normocephalic and atraumatic.      Right Ear: External ear normal.      Left Ear: External ear normal.   Eyes:      Extraocular Movements: Extraocular movements intact.      Conjunctiva/sclera: Conjunctivae normal.   Cardiovascular:      Rate and Rhythm: Normal rate.   Pulmonary:      Effort: Pulmonary effort is normal. No respiratory distress.   Musculoskeletal:         General: Tenderness present.      Comments: See below for dedicated foot exam   Skin:     General: Skin is warm and dry.      Findings: No bruising.   Neurological:      Mental Status: She is alert and oriented to person, place, and time. Mental status is at baseline.      Gait: Gait abnormal.   Psychiatric:         Mood and Affect: Mood normal.         Behavior: Behavior normal.         Thought Content: Thought content normal.         Judgment: Judgment normal.     left  foot exam :   Inspection:  no swelling, redness, ecchymosis. No bunion/bunionette present.   Ttp:  5th MT:  NO  MTs: YES proximal 3rd or 4th at junction of cuboid  Navicular:  NO  Peroneal:  NO  Posterior tib:  NO  Plantar fascia:  NO  Calcaneus:  NO  Achilles: NO  Anterior tibialis: NO  Range of Motion:   Flexion/extension toes: normal, but pain with dorsiflexion of toes   Midfoot: normal   Ankle: normal  Special tests:   MT squeeze: negative   Anterior drawer: negative   Talar tilt: negative

## 2025-03-17 ENCOUNTER — TRANSFERRED RECORDS (OUTPATIENT)
Dept: MULTI SPECIALTY CLINIC | Facility: CLINIC | Age: 55
End: 2025-03-17
Payer: COMMERCIAL

## 2025-03-17 DIAGNOSIS — M84.375A STRESS FRACTURE, LEFT FOOT, INITIAL ENCOUNTER FOR FRACTURE: Primary | ICD-10-CM

## 2025-03-17 LAB — RETINOPATHY: NORMAL

## 2025-03-25 ENCOUNTER — LAB (OUTPATIENT)
Dept: LAB | Facility: CLINIC | Age: 55
End: 2025-03-25
Payer: COMMERCIAL

## 2025-03-25 DIAGNOSIS — E11.9 CONTROLLED TYPE 2 DIABETES MELLITUS WITHOUT COMPLICATION, WITHOUT LONG-TERM CURRENT USE OF INSULIN (H): ICD-10-CM

## 2025-03-25 LAB
CREAT UR-MCNC: 94.5 MG/DL
EST. AVERAGE GLUCOSE BLD GHB EST-MCNC: 126 MG/DL
HBA1C MFR BLD: 6 % (ref 0–5.6)
MICROALBUMIN UR-MCNC: <12 MG/L
MICROALBUMIN/CREAT UR: NORMAL MG/G{CREAT}

## 2025-03-25 PROCEDURE — 36415 COLL VENOUS BLD VENIPUNCTURE: CPT

## 2025-03-25 PROCEDURE — 82043 UR ALBUMIN QUANTITATIVE: CPT

## 2025-03-25 PROCEDURE — 82570 ASSAY OF URINE CREATININE: CPT

## 2025-03-25 PROCEDURE — 83036 HEMOGLOBIN GLYCOSYLATED A1C: CPT

## 2025-06-05 ENCOUNTER — ANCILLARY PROCEDURE (OUTPATIENT)
Dept: MAMMOGRAPHY | Facility: CLINIC | Age: 55
End: 2025-06-05
Attending: FAMILY MEDICINE
Payer: COMMERCIAL

## 2025-06-05 DIAGNOSIS — Z12.31 VISIT FOR SCREENING MAMMOGRAM: ICD-10-CM

## 2025-06-05 PROCEDURE — 77063 BREAST TOMOSYNTHESIS BI: CPT | Mod: 52

## 2025-06-28 ENCOUNTER — HEALTH MAINTENANCE LETTER (OUTPATIENT)
Age: 55
End: 2025-06-28

## 2025-08-06 PROBLEM — R10.9 ABDOMINAL PAIN, OTHER SPECIFIED SITE: Status: RESOLVED | Noted: 2022-11-30 | Resolved: 2025-08-06

## 2025-08-13 ENCOUNTER — ANCILLARY PROCEDURE (OUTPATIENT)
Dept: GENERAL RADIOLOGY | Facility: CLINIC | Age: 55
End: 2025-08-13
Attending: FAMILY MEDICINE
Payer: COMMERCIAL

## 2025-08-13 ENCOUNTER — OFFICE VISIT (OUTPATIENT)
Dept: FAMILY MEDICINE | Facility: CLINIC | Age: 55
End: 2025-08-13
Payer: COMMERCIAL

## 2025-08-13 VITALS
WEIGHT: 238.38 LBS | TEMPERATURE: 98.1 F | BODY MASS INDEX: 39.72 KG/M2 | DIASTOLIC BLOOD PRESSURE: 84 MMHG | OXYGEN SATURATION: 96 % | SYSTOLIC BLOOD PRESSURE: 136 MMHG | HEIGHT: 65 IN | HEART RATE: 65 BPM | RESPIRATION RATE: 16 BRPM

## 2025-08-13 DIAGNOSIS — M79.641 PAIN OF RIGHT HAND: ICD-10-CM

## 2025-08-13 DIAGNOSIS — M54.42 CHRONIC BILATERAL LOW BACK PAIN WITH LEFT-SIDED SCIATICA: Primary | ICD-10-CM

## 2025-08-13 DIAGNOSIS — E11.9 TYPE 2 DIABETES MELLITUS WITHOUT COMPLICATION, WITHOUT LONG-TERM CURRENT USE OF INSULIN (H): ICD-10-CM

## 2025-08-13 DIAGNOSIS — G89.29 CHRONIC BILATERAL LOW BACK PAIN WITH LEFT-SIDED SCIATICA: Primary | ICD-10-CM

## 2025-08-13 DIAGNOSIS — M54.42 CHRONIC BILATERAL LOW BACK PAIN WITH LEFT-SIDED SCIATICA: ICD-10-CM

## 2025-08-13 DIAGNOSIS — Z13.6 SCREENING FOR CARDIOVASCULAR CONDITION: ICD-10-CM

## 2025-08-13 DIAGNOSIS — G89.29 CHRONIC BILATERAL LOW BACK PAIN WITH LEFT-SIDED SCIATICA: ICD-10-CM

## 2025-08-13 LAB
ANION GAP SERPL CALCULATED.3IONS-SCNC: 12 MMOL/L (ref 7–15)
BUN SERPL-MCNC: 9.6 MG/DL (ref 6–20)
CALCIUM SERPL-MCNC: 9.5 MG/DL (ref 8.8–10.4)
CHLORIDE SERPL-SCNC: 104 MMOL/L (ref 98–107)
CHOLEST SERPL-MCNC: 164 MG/DL
CREAT SERPL-MCNC: 0.77 MG/DL (ref 0.51–0.95)
EGFRCR SERPLBLD CKD-EPI 2021: >90 ML/MIN/1.73M2
EST. AVERAGE GLUCOSE BLD GHB EST-MCNC: 137 MG/DL
FASTING STATUS PATIENT QL REPORTED: YES
FASTING STATUS PATIENT QL REPORTED: YES
GLUCOSE SERPL-MCNC: 115 MG/DL (ref 70–99)
HBA1C MFR BLD: 6.4 % (ref 0–5.6)
HCO3 SERPL-SCNC: 25 MMOL/L (ref 22–29)
HDLC SERPL-MCNC: 32 MG/DL
LDLC SERPL CALC-MCNC: 91 MG/DL
NONHDLC SERPL-MCNC: 132 MG/DL
POTASSIUM SERPL-SCNC: 4.4 MMOL/L (ref 3.4–5.3)
RHEUMATOID FACT SERPL-ACNC: <10 IU/ML
SODIUM SERPL-SCNC: 141 MMOL/L (ref 135–145)
TRIGL SERPL-MCNC: 206 MG/DL

## 2025-08-13 PROCEDURE — 86431 RHEUMATOID FACTOR QUANT: CPT | Performed by: FAMILY MEDICINE

## 2025-08-13 PROCEDURE — 36415 COLL VENOUS BLD VENIPUNCTURE: CPT | Performed by: FAMILY MEDICINE

## 2025-08-13 PROCEDURE — 99214 OFFICE O/P EST MOD 30 MIN: CPT | Performed by: FAMILY MEDICINE

## 2025-08-13 PROCEDURE — 3075F SYST BP GE 130 - 139MM HG: CPT | Performed by: FAMILY MEDICINE

## 2025-08-13 PROCEDURE — 80048 BASIC METABOLIC PNL TOTAL CA: CPT | Performed by: FAMILY MEDICINE

## 2025-08-13 PROCEDURE — 73130 X-RAY EXAM OF HAND: CPT | Mod: TC | Performed by: RADIOLOGY

## 2025-08-13 PROCEDURE — 83036 HEMOGLOBIN GLYCOSYLATED A1C: CPT | Performed by: FAMILY MEDICINE

## 2025-08-13 PROCEDURE — 3079F DIAST BP 80-89 MM HG: CPT | Performed by: FAMILY MEDICINE

## 2025-08-13 PROCEDURE — 86200 CCP ANTIBODY: CPT | Performed by: FAMILY MEDICINE

## 2025-08-13 PROCEDURE — 3048F LDL-C <100 MG/DL: CPT | Performed by: FAMILY MEDICINE

## 2025-08-13 PROCEDURE — 3044F HG A1C LEVEL LT 7.0%: CPT | Performed by: FAMILY MEDICINE

## 2025-08-13 PROCEDURE — 80061 LIPID PANEL: CPT | Performed by: FAMILY MEDICINE

## 2025-08-13 PROCEDURE — G2211 COMPLEX E/M VISIT ADD ON: HCPCS | Performed by: FAMILY MEDICINE

## 2025-08-13 PROCEDURE — 72100 X-RAY EXAM L-S SPINE 2/3 VWS: CPT | Mod: TC | Performed by: RADIOLOGY

## 2025-08-13 PROCEDURE — 86038 ANTINUCLEAR ANTIBODIES: CPT | Performed by: FAMILY MEDICINE

## 2025-08-13 PROCEDURE — 86039 ANTINUCLEAR ANTIBODIES (ANA): CPT | Performed by: FAMILY MEDICINE

## 2025-08-14 ENCOUNTER — RESULTS FOLLOW-UP (OUTPATIENT)
Dept: FAMILY MEDICINE | Facility: CLINIC | Age: 55
End: 2025-08-14

## 2025-08-14 DIAGNOSIS — I10 BENIGN ESSENTIAL HYPERTENSION: ICD-10-CM

## 2025-08-14 LAB — CCP AB SER IA-ACNC: 1.4 U/ML

## 2025-08-15 LAB
ANA PAT SER IF-IMP: ABNORMAL
ANA SER QL IF: ABNORMAL
ANA TITR SER IF: ABNORMAL {TITER}

## 2025-08-17 RX ORDER — CYCLOBENZAPRINE HCL 5 MG
5 TABLET ORAL 3 TIMES DAILY PRN
Qty: 30 TABLET | Refills: 1 | Status: SHIPPED | OUTPATIENT
Start: 2025-08-17

## 2025-08-17 RX ORDER — HYDROCHLOROTHIAZIDE 12.5 MG/1
12.5 TABLET ORAL DAILY
Qty: 90 TABLET | Refills: 3 | Status: SHIPPED | OUTPATIENT
Start: 2025-08-17